# Patient Record
Sex: FEMALE | Race: WHITE | Employment: FULL TIME | URBAN - METROPOLITAN AREA
[De-identification: names, ages, dates, MRNs, and addresses within clinical notes are randomized per-mention and may not be internally consistent; named-entity substitution may affect disease eponyms.]

---

## 2018-07-03 ENCOUNTER — APPOINTMENT (OUTPATIENT)
Dept: GENERAL RADIOLOGY | Age: 48
End: 2018-07-03
Attending: EMERGENCY MEDICINE
Payer: COMMERCIAL

## 2018-07-03 ENCOUNTER — HOSPITAL ENCOUNTER (OUTPATIENT)
Age: 48
Discharge: HOME | End: 2018-07-03
Attending: EMERGENCY MEDICINE
Payer: COMMERCIAL

## 2018-07-03 VITALS
HEART RATE: 88 BPM | SYSTOLIC BLOOD PRESSURE: 107 MMHG | DIASTOLIC BLOOD PRESSURE: 62 MMHG | OXYGEN SATURATION: 97 % | WEIGHT: 156 LBS | RESPIRATION RATE: 18 BRPM | BODY MASS INDEX: 27 KG/M2 | TEMPERATURE: 98 F

## 2018-07-03 DIAGNOSIS — S76.012A HIP STRAIN, LEFT, INITIAL ENCOUNTER: Primary | ICD-10-CM

## 2018-07-03 PROCEDURE — 99204 OFFICE O/P NEW MOD 45 MIN: CPT

## 2018-07-03 PROCEDURE — 99203 OFFICE O/P NEW LOW 30 MIN: CPT

## 2018-07-03 PROCEDURE — 73502 X-RAY EXAM HIP UNI 2-3 VIEWS: CPT | Performed by: EMERGENCY MEDICINE

## 2018-07-03 RX ORDER — METHYLPREDNISOLONE 4 MG/1
TABLET ORAL
Qty: 1 PACKAGE | Refills: 0 | Status: SHIPPED | OUTPATIENT
Start: 2018-07-03 | End: 2018-07-08

## 2018-07-03 NOTE — ED PROVIDER NOTES
Patient Seen in: [facility] Immediate Care In [address] [place] [address]    History   Patient presents with:  Lower Extremity Injury (musculoskeletal)    Stated Complaint: leg pain    HPI    The patient is a 44-year-old female with past history of endometriosis, st injection  ENT: TMs are clear and flat bilaterally.   There is no posterior pharyngeal erythema  Chest: Clear to auscultation, no tenderness  Cardiovascular: Regular rate and rhythm without murmur  Abdomen: Soft, nontender and nondistended  Neurologic: Shantal

## 2018-07-20 PROCEDURE — 85613 RUSSELL VIPER VENOM DILUTED: CPT | Performed by: FAMILY MEDICINE

## 2018-07-20 PROCEDURE — 85732 THROMBOPLASTIN TIME PARTIAL: CPT | Performed by: FAMILY MEDICINE

## 2018-07-20 PROCEDURE — 85705 THROMBOPLASTIN INHIBITION: CPT | Performed by: FAMILY MEDICINE

## 2018-07-20 PROCEDURE — 85610 PROTHROMBIN TIME: CPT | Performed by: FAMILY MEDICINE

## 2020-01-09 ENCOUNTER — OFFICE VISIT (OUTPATIENT)
Dept: OTOLARYNGOLOGY | Facility: CLINIC | Age: 50
End: 2020-01-09
Payer: COMMERCIAL

## 2020-01-09 VITALS
WEIGHT: 150 LBS | DIASTOLIC BLOOD PRESSURE: 75 MMHG | SYSTOLIC BLOOD PRESSURE: 110 MMHG | HEIGHT: 64 IN | TEMPERATURE: 98 F | BODY MASS INDEX: 25.61 KG/M2

## 2020-01-09 DIAGNOSIS — J32.9 CHRONIC SINUSITIS, UNSPECIFIED LOCATION: Primary | ICD-10-CM

## 2020-01-09 PROCEDURE — 99243 OFF/OP CNSLTJ NEW/EST LOW 30: CPT | Performed by: OTOLARYNGOLOGY

## 2020-01-09 RX ORDER — IBUPROFEN 200 MG
600 TABLET ORAL DAILY
COMMUNITY

## 2020-01-09 RX ORDER — PSEUDOEPHEDRINE HYDROCHLORIDE 30 MG/1
60 TABLET ORAL DAILY
COMMUNITY

## 2020-01-09 NOTE — PROGRESS NOTES
[facility] 52[facility]  Patient presents with:  Sinus Problem: headaches, nasal congestion, facial pain and pressure for several years   Nose Problem: nosebleed from right nostril on Monday     HPI:   She has been experiencing severe h Neurological Normal Memory - Normal. Cranial nerves - Cranial nerves II through XII grossly intact.    Neck Exam Normal Inspection - Normal. Palpation - Normal. Parotid gland - Normal. Thyroid gland - Normal.   Psychiatric Normal Orientation - Oriented to

## 2020-01-15 ENCOUNTER — HOSPITAL ENCOUNTER (OUTPATIENT)
Dept: CT IMAGING | Facility: HOSPITAL | Age: 50
Discharge: HOME | End: 2020-01-15
Attending: OTOLARYNGOLOGY
Payer: COMMERCIAL

## 2020-01-15 DIAGNOSIS — J32.9 CHRONIC SINUSITIS, UNSPECIFIED LOCATION: ICD-10-CM

## 2020-01-15 PROCEDURE — 70486 CT MAXILLOFACIAL W/O DYE: CPT | Performed by: OTOLARYNGOLOGY

## 2020-01-16 ENCOUNTER — PATIENT MESSAGE (OUTPATIENT)
Dept: OTOLARYNGOLOGY | Facility: CLINIC | Age: 50
End: 2020-01-16

## 2020-01-16 NOTE — TELEPHONE ENCOUNTER
From: Nadia Jordan  To: Logan Coe. Lydia Del Rosario MD  Sent: 1/16/2020 12:40 PM CST  Subject: Test Results Question    Hello just following up on my CT Sinus scan yesterday morning.  I am sure you are waiting for the radiology report, but today was another horr

## 2020-01-16 NOTE — TELEPHONE ENCOUNTER
I think a sent a message about her CT earlier in the day. Please let her know that her CT scan shows that her sinuses are really pretty clear. I cannot explain her severe headaches based upon any issues with her sinuses.   I would recommend that she see a

## 2020-01-20 ENCOUNTER — PATIENT MESSAGE (OUTPATIENT)
Dept: OTOLARYNGOLOGY | Facility: CLINIC | Age: 50
End: 2020-01-20

## 2020-01-20 NOTE — TELEPHONE ENCOUNTER
From: Brooklyn Bae  To: Adi Nayak. Leocadia Mcburney, MD  Sent: 1/20/2020 1:07 PM CST  Subject: Test Results Question    Dr. Leocadia Mcburney- prior to my appt. tomorrow. The CT Sinus came up fairly clear-deviated septum. Would it show anything else related to tumors/brain?

## 2020-01-21 ENCOUNTER — OFFICE VISIT (OUTPATIENT)
Dept: OTOLARYNGOLOGY | Facility: CLINIC | Age: 50
End: 2020-01-21
Payer: COMMERCIAL

## 2020-01-21 VITALS
BODY MASS INDEX: 25.61 KG/M2 | TEMPERATURE: 98 F | SYSTOLIC BLOOD PRESSURE: 99 MMHG | HEIGHT: 64 IN | WEIGHT: 150 LBS | DIASTOLIC BLOOD PRESSURE: 65 MMHG

## 2020-01-21 DIAGNOSIS — J34.2 DEVIATED SEPTUM: ICD-10-CM

## 2020-01-21 DIAGNOSIS — R51.9 HEADACHE DISORDER: Primary | ICD-10-CM

## 2020-01-21 PROCEDURE — 99213 OFFICE O/P EST LOW 20 MIN: CPT | Performed by: OTOLARYNGOLOGY

## 2020-01-22 NOTE — PROGRESS NOTES
Nancy Martinez is a 52year old female. Patient presents with: Follow - Up: CT scan result done on 1/15/20    HPI:   She is in follow-up of a CT of her sinuses. I reviewed the CT scan myself and the findings with her.   CT demonstrates clear sinus pa Inspection - Normal. Palpation - Normal. Parotid gland - Normal. Thyroid gland - Normal.   Psychiatric Normal Orientation - Oriented to time, place, person & situation. Appropriate mood and affect. Lymph Detail Normal Submental. Submandibular.  Anterior c

## 2020-02-01 ENCOUNTER — OFFICE VISIT (OUTPATIENT)
Dept: NEUROLOGY | Facility: CLINIC | Age: 50
End: 2020-02-01
Payer: COMMERCIAL

## 2020-02-01 VITALS
BODY MASS INDEX: 25.61 KG/M2 | DIASTOLIC BLOOD PRESSURE: 60 MMHG | RESPIRATION RATE: 16 BRPM | HEIGHT: 64 IN | HEART RATE: 88 BPM | WEIGHT: 150 LBS | SYSTOLIC BLOOD PRESSURE: 98 MMHG

## 2020-02-01 DIAGNOSIS — G89.29 CHRONIC LEFT-SIDED LOW BACK PAIN WITH LEFT-SIDED SCIATICA: ICD-10-CM

## 2020-02-01 DIAGNOSIS — G43.019 INTRACTABLE MIGRAINE WITHOUT AURA AND WITHOUT STATUS MIGRAINOSUS: Primary | ICD-10-CM

## 2020-02-01 DIAGNOSIS — M54.42 CHRONIC LEFT-SIDED LOW BACK PAIN WITH LEFT-SIDED SCIATICA: ICD-10-CM

## 2020-02-01 DIAGNOSIS — R51.9 CHRONIC DAILY HEADACHE: ICD-10-CM

## 2020-02-01 PROCEDURE — 99244 OFF/OP CNSLTJ NEW/EST MOD 40: CPT | Performed by: OTHER

## 2020-02-01 RX ORDER — METHYLPREDNISOLONE 4 MG/1
TABLET ORAL
Qty: 1 PACKAGE | Refills: 0 | Status: SHIPPED | OUTPATIENT
Start: 2020-02-01 | End: 2020-08-17

## 2020-02-01 RX ORDER — ONDANSETRON 4 MG/1
TABLET, FILM COATED ORAL
Qty: 30 TABLET | Refills: 3 | Status: SHIPPED | OUTPATIENT
Start: 2020-02-01

## 2020-02-01 RX ORDER — TOPIRAMATE 25 MG/1
TABLET ORAL
Qty: 60 TABLET | Refills: 3 | Status: SHIPPED | OUTPATIENT
Start: 2020-02-01 | End: 2020-04-21

## 2020-02-03 ENCOUNTER — TELEPHONE (OUTPATIENT)
Dept: NEUROLOGY | Facility: CLINIC | Age: 50
End: 2020-02-03

## 2020-02-03 NOTE — TELEPHONE ENCOUNTER
[employer] for authorization of approval forTrigger point injections 2-3 muscle groups, occipital nerve block to left sidecpt codes , 96295, 31887. Aurthorization is not required. Transaction ID: [id number]   Will inform Oseas.

## 2020-02-09 ENCOUNTER — PATIENT MESSAGE (OUTPATIENT)
Dept: NEUROLOGY | Facility: CLINIC | Age: 50
End: 2020-02-09

## 2020-02-10 NOTE — TELEPHONE ENCOUNTER
From: Afshan Madden  To:  Alfie Singh MD  Sent: 2/9/2020 9:32 PM CST  Subject: Visit Follow-up Question    Hi Dr. Massiel Pabon, wondering about my neck and before any injections are done wouldn't it be beneficial to have a neck exam and possibly an MRI o

## 2020-02-11 NOTE — PROGRESS NOTES
Neurology Outpatient Consult Note    Jennifer Collier : 1970   Referring Physician: Dr. Ana Tolentino  HPI:     Jennifer Collier is a 52year old female who is being seen in neurologic evaluation. Patient being seen for several complaints. 60 mg by mouth daily. • ibuprofen 200 MG Oral Tab Take 600 mg by mouth daily.           Past Medical History:   Diagnosis Date   • Carpal tunnel syndrome     right hand   • Endometriosis    • Inflammatory arthritis       Past Surgical History:   Procedu and pinprick throughout  Reflexes: DTRs 2+ in bilateral biceps, brachioradialis, patella  Coordination / gait: no finger-nose-finger dysmetria, normal gait    Studies / labs: Reviewed  ESR, CRP normal  AMMY negative, rheumatoid factor negative, TSH normal

## 2020-03-03 ENCOUNTER — PATIENT MESSAGE (OUTPATIENT)
Dept: NEUROLOGY | Facility: CLINIC | Age: 50
End: 2020-03-03

## 2020-03-03 NOTE — TELEPHONE ENCOUNTER
From: Milo Culp  To: Elvira Mittal MD  Sent: 3/3/2020 2:10 PM CST  Subject: Visit Follow-up Question    Hi Dr. Grant Self- checking in about the scan on my neck.  I am prett.y confident that the muscles and tendons are an issue in my meck along with

## 2020-04-10 ENCOUNTER — TELEPHONE (OUTPATIENT)
Dept: NEUROLOGY | Facility: CLINIC | Age: 50
End: 2020-04-10

## 2020-04-14 ENCOUNTER — PATIENT MESSAGE (OUTPATIENT)
Dept: NEUROLOGY | Facility: CLINIC | Age: 50
End: 2020-04-14

## 2020-04-21 ENCOUNTER — VIRTUAL PHONE E/M (OUTPATIENT)
Dept: NEUROLOGY | Facility: CLINIC | Age: 50
End: 2020-04-21
Payer: COMMERCIAL

## 2020-04-21 DIAGNOSIS — M54.2 NECK PAIN: ICD-10-CM

## 2020-04-21 DIAGNOSIS — G43.019 INTRACTABLE MIGRAINE WITHOUT AURA AND WITHOUT STATUS MIGRAINOSUS: Primary | ICD-10-CM

## 2020-04-21 DIAGNOSIS — R51.9 CHRONIC DAILY HEADACHE: ICD-10-CM

## 2020-04-21 PROCEDURE — 99212 OFFICE O/P EST SF 10 MIN: CPT | Performed by: OTHER

## 2020-04-21 RX ORDER — CYCLOBENZAPRINE HCL 10 MG
TABLET ORAL
Qty: 30 TABLET | Refills: 3 | Status: SHIPPED | OUTPATIENT
Start: 2020-04-21 | End: 2021-07-28

## 2020-04-21 RX ORDER — TOPIRAMATE 25 MG/1
75 TABLET ORAL NIGHTLY
Qty: 90 TABLET | Refills: 3 | Status: SHIPPED | OUTPATIENT
Start: 2020-04-21 | End: 2020-08-17

## 2020-04-21 RX ORDER — RIZATRIPTAN BENZOATE 10 MG/1
TABLET ORAL
Qty: 12 TABLET | Refills: 3 | Status: SHIPPED | OUTPATIENT
Start: 2020-04-21 | End: 2021-05-24

## 2020-04-23 NOTE — PROGRESS NOTES
Neurology Telephone / Virtual Follow-up Note    Scott Giang is a 48year old female. HPI:     I saw patient in the office last in February of this year. She is frustrated, because headaches do not seem to be improved.   She is not certain the No dysarthria or dysphonia    ASSESSMENT/PLAN:   Intractable migraine without aura and without status migrainosus  Chronic daily headache  Neck pain    Likely strong cervicogenic component to migraine headaches.       –Had a long discussion regarding variou

## 2020-06-09 ENCOUNTER — PATIENT MESSAGE (OUTPATIENT)
Dept: NEUROLOGY | Facility: CLINIC | Age: 50
End: 2020-06-09

## 2020-06-09 DIAGNOSIS — G43.019 INTRACTABLE MIGRAINE WITHOUT AURA AND WITHOUT STATUS MIGRAINOSUS: Primary | ICD-10-CM

## 2020-06-09 DIAGNOSIS — M54.2 NECK PAIN: ICD-10-CM

## 2020-06-10 ENCOUNTER — TELEPHONE (OUTPATIENT)
Dept: NEUROLOGY | Facility: CLINIC | Age: 50
End: 2020-06-10

## 2020-06-10 NOTE — TELEPHONE ENCOUNTER
[employer] for authorization of approval for trigger point injections cpt codes , 23052. Authorization is not required. Transaction ID: [id number]. Per Dr. Rosangela Layne, add on 06/12/20 at 12 noon. Will need order/referral. Will inform Nursing.  [facility]

## 2020-06-10 NOTE — TELEPHONE ENCOUNTER
From: Rosezetta Ahumada  To: Maureen Kraus MD  Sent: 6/9/2020 7:25 PM CDT  Subject: Prescription Question    HI Dr. Luna Blackman weaned off Topiramate.  When the strength increased I was feeling more forgetful, tired, having issues w/ my legs/feet tingling/cra

## 2020-06-16 NOTE — TELEPHONE ENCOUNTER
If we can please call pt re: trigger point injections (she is inquiring about specifics of cost before scheduling), thank you.

## 2020-07-23 ENCOUNTER — TELEPHONE (OUTPATIENT)
Dept: FAMILY MEDICINE CLINIC | Facility: CLINIC | Age: 50
End: 2020-07-23

## 2020-07-23 ENCOUNTER — PATIENT MESSAGE (OUTPATIENT)
Dept: FAMILY MEDICINE CLINIC | Facility: CLINIC | Age: 50
End: 2020-07-23

## 2020-07-23 DIAGNOSIS — Z00.00 ANNUAL PHYSICAL EXAM: Primary | ICD-10-CM

## 2020-07-23 NOTE — TELEPHONE ENCOUNTER
Ish message sent. See telephone acute encounter 7/23/20      From: Sumi Webber  To: Snehal Ríos MD  Sent: 7/23/2020  6:53 PM CDT  Subject: Non-Urgent Medical Question    Hi Dr. Rome Boyle.  For some reason my messages have not been going thru an

## 2020-07-23 NOTE — TELEPHONE ENCOUNTER
Ish message sent. RN=call and triage, Rashad Kumar been seen in our clinic. No future appointments.         ----- Message from Nik Herr sent at 7/23/2020  6:53 PM CDT -----  Regarding: Non-Urgent Medical Question  Contact: [phone]

## 2020-07-24 ENCOUNTER — TELEPHONE (OUTPATIENT)
Dept: NEUROLOGY | Facility: CLINIC | Age: 50
End: 2020-07-24

## 2020-07-24 NOTE — TELEPHONE ENCOUNTER
See acute telephone encounter 7/23/20. From: Quirino Montesinos  To: Iris Hernandez MD  Sent: 7/23/2020  7:16 PM CDT  Subject: Other    This is for Friday AM 7/24/20 when office opens Glory Hunter. I am due for my annual physical and fasting la

## 2020-07-24 NOTE — TELEPHONE ENCOUNTER
L/m advising Dr. Madhu Rock is leaving the clinic/practice. Informed Dr. Simón Richard or Dr. Toma Connolly can provide Botox injections. Will need to know who she would likE to see in order to initiate authorization request. Await return call.

## 2020-07-27 NOTE — TELEPHONE ENCOUNTER
Dr. Wander Melendez, patient requesting to be seen for issues below and her annual. She is on vacation week of August 17. Would it be okay to use one of your \"res 24\" slots.

## 2020-07-27 NOTE — TELEPHONE ENCOUNTER
Left message to call back =second attempt. [employer] message read by patient on 7/23/20. TRIAGE SUPPORT=please send NO PHONE RESPONSE LETTER. No future appointments.       RE: Non-Urgent Medical Question   Message [phone]   From  Bobby Crawford

## 2020-07-27 NOTE — TELEPHONE ENCOUNTER
Patient returned call. Phone call transferred to [facility] to assist in scheduling annual physical for patient. Patient schedule for physical on 08/17/20    Dr. Irineo Najjar, patient asked if you can order blood work prior to her annual physical. Please advise.

## 2020-07-29 NOTE — TELEPHONE ENCOUNTER
Pt calling to ask what specific labs were ordered and if fasting is needed. Pt informed of lab orders and that fasting is needed; provided with central scheduling phone # to schedule appt as states was not informed appt was needed in voice mail.

## 2020-07-29 NOTE — TELEPHONE ENCOUNTER
DR Robertson=see below, pended annual blood tests. Add if needed.     Future Appointments   Date Time Provider Chrissy Ricci   8/17/2020 11:30 AM Hazel Bravo MD [facility]

## 2020-08-01 ENCOUNTER — LAB ENCOUNTER (OUTPATIENT)
Dept: LAB | Age: 50
End: 2020-08-01
Attending: FAMILY MEDICINE
Payer: COMMERCIAL

## 2020-08-01 DIAGNOSIS — Z00.00 ANNUAL PHYSICAL EXAM: ICD-10-CM

## 2020-08-01 LAB
ALBUMIN SERPL-MCNC: 4.1 G/DL (ref 3.4–5)
ALBUMIN/GLOB SERPL: 1.2 {RATIO} (ref 1–2)
ALP LIVER SERPL-CCNC: 70 U/L (ref 39–100)
ALT SERPL-CCNC: 17 U/L (ref 13–56)
ANION GAP SERPL CALC-SCNC: 3 MMOL/L (ref 0–18)
AST SERPL-CCNC: 13 U/L (ref 15–37)
BASOPHILS # BLD AUTO: 0.07 X10(3) UL (ref 0–0.2)
BASOPHILS NFR BLD AUTO: 1.1 %
BILIRUB SERPL-MCNC: 0.6 MG/DL (ref 0.1–2)
BUN BLD-MCNC: 9 MG/DL (ref 7–18)
BUN/CREAT SERPL: 14.1 (ref 10–20)
CALCIUM BLD-MCNC: 8.5 MG/DL (ref 8.5–10.1)
CHLORIDE SERPL-SCNC: 108 MMOL/L (ref 98–112)
CHOLEST SMN-MCNC: 154 MG/DL (ref ?–200)
CO2 SERPL-SCNC: 29 MMOL/L (ref 21–32)
CREAT BLD-MCNC: 0.64 MG/DL (ref 0.55–1.02)
DEPRECATED RDW RBC AUTO: 41.9 FL (ref 35.1–46.3)
EOSINOPHIL # BLD AUTO: 0.08 X10(3) UL (ref 0–0.7)
EOSINOPHIL NFR BLD AUTO: 1.3 %
ERYTHROCYTE [DISTWIDTH] IN BLOOD BY AUTOMATED COUNT: 12.7 % (ref 11–15)
GLOBULIN PLAS-MCNC: 3.3 G/DL (ref 2.8–4.4)
GLUCOSE BLD-MCNC: 84 MG/DL (ref 70–99)
HCT VFR BLD AUTO: 39.6 % (ref 35–48)
HDLC SERPL-MCNC: 56 MG/DL (ref 40–59)
HGB BLD-MCNC: 13.1 G/DL (ref 12–16)
IMM GRANULOCYTES # BLD AUTO: 0.02 X10(3) UL (ref 0–1)
IMM GRANULOCYTES NFR BLD: 0.3 %
LDLC SERPL CALC-MCNC: 87 MG/DL (ref ?–100)
LYMPHOCYTES # BLD AUTO: 1.14 X10(3) UL (ref 1–4)
LYMPHOCYTES NFR BLD AUTO: 18.6 %
M PROTEIN MFR SERPL ELPH: 7.4 G/DL (ref 6.4–8.2)
MCH RBC QN AUTO: 29.6 PG (ref 26–34)
MCHC RBC AUTO-ENTMCNC: 33.1 G/DL (ref 31–37)
MCV RBC AUTO: 89.4 FL (ref 80–100)
MONOCYTES # BLD AUTO: 0.45 X10(3) UL (ref 0.1–1)
MONOCYTES NFR BLD AUTO: 7.4 %
NEUTROPHILS # BLD AUTO: 4.36 X10 (3) UL (ref 1.5–7.7)
NEUTROPHILS # BLD AUTO: 4.36 X10(3) UL (ref 1.5–7.7)
NEUTROPHILS NFR BLD AUTO: 71.3 %
NONHDLC SERPL-MCNC: 98 MG/DL (ref ?–130)
OSMOLALITY SERPL CALC.SUM OF ELEC: 288 MOSM/KG (ref 275–295)
PATIENT FASTING Y/N/NP: YES
PATIENT FASTING Y/N/NP: YES
PLATELET # BLD AUTO: 259 10(3)UL (ref 150–450)
POTASSIUM SERPL-SCNC: 3.7 MMOL/L (ref 3.5–5.1)
RBC # BLD AUTO: 4.43 X10(6)UL (ref 3.8–5.3)
SODIUM SERPL-SCNC: 140 MMOL/L (ref 136–145)
TRIGL SERPL-MCNC: 57 MG/DL (ref 30–149)
TSI SER-ACNC: 1.38 MIU/ML (ref 0.36–3.74)
VIT B12 SERPL-MCNC: 520 PG/ML (ref 193–986)
VLDLC SERPL CALC-MCNC: 11 MG/DL (ref 0–30)
WBC # BLD AUTO: 6.1 X10(3) UL (ref 4–11)

## 2020-08-01 PROCEDURE — 82607 VITAMIN B-12: CPT

## 2020-08-01 PROCEDURE — 36415 COLL VENOUS BLD VENIPUNCTURE: CPT

## 2020-08-01 PROCEDURE — 80053 COMPREHEN METABOLIC PANEL: CPT

## 2020-08-01 PROCEDURE — 84443 ASSAY THYROID STIM HORMONE: CPT

## 2020-08-01 PROCEDURE — 82306 VITAMIN D 25 HYDROXY: CPT

## 2020-08-01 PROCEDURE — 85025 COMPLETE CBC W/AUTO DIFF WBC: CPT

## 2020-08-01 PROCEDURE — 80061 LIPID PANEL: CPT

## 2020-08-03 LAB — 25(OH)D3 SERPL-MCNC: 21 NG/ML (ref 30–100)

## 2020-08-08 NOTE — PROGRESS NOTES
Parul Come - Will review at your upcoming appointment but your labs were normal except low vitamin D levels.  You should take vitamin D 2000 units daily to boost those levels. - Dr. Wander Melendez

## 2020-08-17 ENCOUNTER — OFFICE VISIT (OUTPATIENT)
Dept: FAMILY MEDICINE CLINIC | Facility: CLINIC | Age: 50
End: 2020-08-17
Payer: COMMERCIAL

## 2020-08-17 ENCOUNTER — HOSPITAL ENCOUNTER (OUTPATIENT)
Dept: GENERAL RADIOLOGY | Age: 50
Discharge: HOME | End: 2020-08-17
Attending: FAMILY MEDICINE
Payer: COMMERCIAL

## 2020-08-17 VITALS
DIASTOLIC BLOOD PRESSURE: 68 MMHG | BODY MASS INDEX: 25.61 KG/M2 | WEIGHT: 150 LBS | HEART RATE: 83 BPM | HEIGHT: 64 IN | SYSTOLIC BLOOD PRESSURE: 106 MMHG

## 2020-08-17 DIAGNOSIS — G43.019 INTRACTABLE MIGRAINE WITHOUT AURA AND WITHOUT STATUS MIGRAINOSUS: ICD-10-CM

## 2020-08-17 DIAGNOSIS — M54.2 NECK PAIN: ICD-10-CM

## 2020-08-17 DIAGNOSIS — E55.9 VITAMIN D DEFICIENCY: ICD-10-CM

## 2020-08-17 DIAGNOSIS — Z00.00 ANNUAL PHYSICAL EXAM: Primary | ICD-10-CM

## 2020-08-17 DIAGNOSIS — Z12.11 COLON CANCER SCREENING: ICD-10-CM

## 2020-08-17 PROCEDURE — 3078F DIAST BP <80 MM HG: CPT | Performed by: FAMILY MEDICINE

## 2020-08-17 PROCEDURE — 99396 PREV VISIT EST AGE 40-64: CPT | Performed by: FAMILY MEDICINE

## 2020-08-17 PROCEDURE — 72050 X-RAY EXAM NECK SPINE 4/5VWS: CPT | Performed by: FAMILY MEDICINE

## 2020-08-17 PROCEDURE — 3074F SYST BP LT 130 MM HG: CPT | Performed by: FAMILY MEDICINE

## 2020-08-17 PROCEDURE — 3008F BODY MASS INDEX DOCD: CPT | Performed by: FAMILY MEDICINE

## 2020-08-17 NOTE — PROGRESS NOTES
HPI:   Dominic Vail is a 48year old female who presents for a complete physical exam.    Has off this week so happy. Taking ibuprofen 3-6 a day for migraines.  Seeing Dr. Ba Calabrese for migraines but she left - recommend physiatrist. Has a lot of ten Family History   Problem Relation Age of Onset   • Cancer Maternal Grandmother 39        breast cancer   • Breast Cancer Maternal Grandmother 39      Social History:   Social History    Tobacco Use      Smoking status: Never Smoker      Smokeless tobacco Colon cancer screening    - GASTRO - INTERNAL    4. Neck pain    - XR CERVICAL SPINE (4VIEWS) (CPT=72050); Future    5.  Vitamin D deficiency      Sai Sue MD  8/17/2020  11:45 AM

## 2020-08-23 ENCOUNTER — PATIENT MESSAGE (OUTPATIENT)
Dept: FAMILY MEDICINE CLINIC | Facility: CLINIC | Age: 50
End: 2020-08-23

## 2020-08-24 NOTE — PROGRESS NOTES
Umang Castorena - You have curvature straightening which is due to muscle spasms. You do have lower cervical spine arthritis.  I recommend you see Dr. Trell Jacome as discussed for treatment options. - Dr. Lacho Loya

## 2020-09-22 ENCOUNTER — NURSE ONLY (OUTPATIENT)
Dept: GASTROENTEROLOGY | Facility: CLINIC | Age: 50
End: 2020-09-22

## 2020-09-22 NOTE — PROGRESS NOTES
PHONE ROOM--I 883 Leyla Arora TO PATIENT THIS MORNING, AS SHE HAS NOT COMPLETED THE 2 QUESTIONNAIRES. IF SHE CALLS BACK SHE NEEDS ANOTHER PHONE SCREEN APPT OR CAN MAKE AN APPT WITH Laura Winkler [address]. SHE HAS NEVER BEEN SEEN BY OUR OFFICE. THANKS.

## 2021-02-08 RX ORDER — RIZATRIPTAN BENZOATE 10 MG/1
TABLET ORAL
Qty: 12 TABLET | Refills: 0 | OUTPATIENT
Start: 2021-02-08

## 2021-02-08 NOTE — TELEPHONE ENCOUNTER
Denied- Refill request for RIZATRIPTAN BENZOATE 10 MG Oral Tab, use at onset; may repeat once after 2 hours- ONLY 2 IN 24 HOUR PERIOD MAX. , 12 Tablets with 0 Refills    LOV: 4/21/20- Dr. Fernandez Slice: None    Last Refilled: 4/21/20    Patient no longer under prescriber's care.

## 2021-05-24 ENCOUNTER — TELEPHONE (OUTPATIENT)
Dept: FAMILY MEDICINE CLINIC | Facility: CLINIC | Age: 51
End: 2021-05-24

## 2021-05-24 DIAGNOSIS — G43.019 INTRACTABLE MIGRAINE WITHOUT AURA AND WITHOUT STATUS MIGRAINOSUS: Primary | ICD-10-CM

## 2021-05-24 NOTE — TELEPHONE ENCOUNTER
Refill request for Rizatriptan Benzoate 10 MG Oral Tab, use at onset; may repeat once after 2 hours- ONLY 2 IN 24 HOUR PERIOD MAX.  This is a 30 day supply, 12 Tablets with 0 Refills    LOV: 4/21/20-   NOV: 6/29/21-     Last Refilled: 4/21

## 2021-05-24 NOTE — TELEPHONE ENCOUNTER
Spoke with patient ( verified)--reports she completed mammogram Spring 2021 at [facility] ([phone])--asking if Dr. Darryn Rueda received results--unable to locate scanned mammogram results under media tab.      Sent to [facility] office to [facility]

## 2021-05-24 NOTE — TELEPHONE ENCOUNTER
Left detailed message on medical records dept confidential voicemail requesting copy of mammogram report be faxed to [phone]

## 2021-05-25 RX ORDER — RIZATRIPTAN BENZOATE 10 MG/1
TABLET ORAL
Qty: 12 TABLET | Refills: 0 | Status: SHIPPED | OUTPATIENT
Start: 2021-05-25 | End: 2021-08-03

## 2021-05-26 NOTE — TELEPHONE ENCOUNTER
Second attempt: Left detailed message on medical records dept confidential voicemail requesting copy of mammogram report be faxed to [phone]

## 2021-06-03 NOTE — TELEPHONE ENCOUNTER
Third attempt: Left detailed message on medical records dept confidential voicemail requesting copy of mammogram report be faxed to [phone]

## 2021-06-03 NOTE — TELEPHONE ENCOUNTER
Sent mammogram request to [facility] fax# [phone] Attn: Pollo Schuster. Confirmation rec'd.

## 2021-06-29 ENCOUNTER — OFFICE VISIT (OUTPATIENT)
Dept: NEUROLOGY | Facility: CLINIC | Age: 51
End: 2021-06-29
Payer: COMMERCIAL

## 2021-06-29 ENCOUNTER — TELEPHONE (OUTPATIENT)
Dept: NEUROLOGY | Facility: CLINIC | Age: 51
End: 2021-06-29

## 2021-06-29 VITALS
HEART RATE: 90 BPM | BODY MASS INDEX: 25.2 KG/M2 | HEIGHT: 64 IN | WEIGHT: 147.63 LBS | SYSTOLIC BLOOD PRESSURE: 98 MMHG | DIASTOLIC BLOOD PRESSURE: 62 MMHG

## 2021-06-29 DIAGNOSIS — M79.605 PAIN IN BOTH LOWER EXTREMITIES: ICD-10-CM

## 2021-06-29 DIAGNOSIS — IMO0002 CHRONIC MIGRAINE: Primary | ICD-10-CM

## 2021-06-29 DIAGNOSIS — M79.604 PAIN IN BOTH LOWER EXTREMITIES: ICD-10-CM

## 2021-06-29 DIAGNOSIS — M54.2 CERVICALGIA: ICD-10-CM

## 2021-06-29 PROCEDURE — 3078F DIAST BP <80 MM HG: CPT | Performed by: OTHER

## 2021-06-29 PROCEDURE — 3008F BODY MASS INDEX DOCD: CPT | Performed by: OTHER

## 2021-06-29 PROCEDURE — 99215 OFFICE O/P EST HI 40 MIN: CPT | Performed by: OTHER

## 2021-06-29 PROCEDURE — 3074F SYST BP LT 130 MM HG: CPT | Performed by: OTHER

## 2021-06-29 NOTE — PATIENT INSTRUCTIONS
-Preventive: reduce how many migraines you get. Options: Botox every 3 months; or the once monthly injections of Emgality; Ajovy; Aimovig   -Follow up in 1 month or sooner if needed.     -If you develop sudden onset loss of vision, double vision, room spinn swelling)  A few people may have an allergic reactions to this medicine. Symptoms can include difficulty breathing, skin rash, itching, swelling, or severe dizziness. If you notice any of these symptoms, seek medical help quickly.         Botox Injection 20 weakness, loss of bladder control, or changes in vision. Some patients taking this medicine have experienced serious side effects. Please speak with your doctor to understand the risks and benefits associated with this medicine.   Your ability to stay aler (pain, redness, swelling)  · irritation of the eyes  · nausea  · neck pain or stiffness  · pain near injection site  · skin tingling, burning or prickly feeling  · upper respiratory infection  · blurring or changes of vision  · watering of the eyes  Call y good muscle relaxant. It is also useful for constipation which can be a side effect of other medications used to treat migraine. Good sources include nuts, whole grains, and tomatoes. Riboflavin (vitamin B 2) 400 mg in the morning.  This vitamin assists effective. Melatonin: Increasing evidence shows correlation between melatonin secretion and headache conditions. Melatonin supplementation has decreased headache intensity and duration. It is widely used as a sleep aid.   Sleep is natures way of dealin permitted)    Tomato based foods, pizza (lasagna, etc.)    MSG (monosodium glutamate) is disguised as many things; look for these common aliases:  Monopotassium glutamate  Autolysed yeast  Hydrolysed protein  Sodium caseinate  “flavorings”  “all natural pr quiet environment. Relax and reduce stress. Pbvutmn8Rloaq is a free derrick that can instruct you on    some simple relaxtion and breathing techniques. Http://[employer]. [employer] is a    free website that provides teaching videos on relaxation.   Also, there are  man based headaches. There is nothing available that completely prevents headaches from occurring, breaking through, or having periodic flare-ups and fluctuations.   Regardless of what you are using on a daily basis for prevention, episodic headaches should st time first.  If you are unable to wait it out for medications to work, we can also try IV infusions for some temporary relief.        In general, the best that preventive medications or other treatments (including Botox) are able to offer in migraine manage concerns. These issues beyond simple questions require a follow up visit with myself,      Refills:  Please pay attention to when your refills will need to be renewed.  Due to the volume of phone calls daily, this could potentially take a few days, althoug

## 2021-06-29 NOTE — TELEPHONE ENCOUNTER
[employer] for authorization of approval for ACUPUNCTURE THERAPY cpt codes W8152434, [id number]. Authorization is not required. Transaction ID: [id number]. Pt.  informed of above. She will call to schedule appt. Zuhair Rubio

## 2021-06-29 NOTE — PROGRESS NOTES
Tasneem Dub [address]  [address]  [phone]          Established  Follow Up Visit       Date: June 29, 2021  Patient Name: Sumi Webber   MRN: [id number]  Primary physician: [address] [place] [address],Fourth Floor injectables. She does not like taking pills daily. --She has depression. --From her hips to her feet she has excruciating throbbing pain, which is helped with Ibuprofen. She was recommended to see PM&R.      --She is working almost 80 hours per week symmetrically, tongue protrudes midline, and shoulder shrug intact and symmetric. Motor: muscle strength 5/5 both upper and lower extremities, no drift, normal tone.   Reflexes: UE and LE reflexes are equal and reactive  Sensation: intact to light touch  C 39 - 100 U/L 70   Total Bilirubin      0.1 - 2.0 mg/dL 0.6   TOTAL PROTEIN      6.4 - 8.2 g/dL 7.4   Albumin      3.4 - 5.0 g/dL 4.1   Globulin      2.8 - 4.4 g/dL 3.3   A/G Ratio      1.0 - 2.0 1.2   Patient Fasting?        Yes   TSH      0.358 - 3.740 mI avoid developing medication overuse headaches   -Neuroimaging: Hold for now in light of normal exam and similar characteristics of migraine  -Follow up: 1 month so we can determine which preventive option as needed  -Lifestyle information provided      Cer

## 2021-07-27 ENCOUNTER — OFFICE VISIT (OUTPATIENT)
Dept: NEUROLOGY | Facility: CLINIC | Age: 51
End: 2021-07-27
Payer: COMMERCIAL

## 2021-07-27 ENCOUNTER — TELEPHONE (OUTPATIENT)
Dept: NEUROLOGY | Facility: CLINIC | Age: 51
End: 2021-07-27

## 2021-07-27 ENCOUNTER — HOSPITAL ENCOUNTER (OUTPATIENT)
Dept: GENERAL RADIOLOGY | Age: 51
Discharge: HOME | End: 2021-07-27
Attending: PHYSICAL MEDICINE & REHABILITATION
Payer: COMMERCIAL

## 2021-07-27 VITALS
HEIGHT: 64 IN | BODY MASS INDEX: 24.75 KG/M2 | OXYGEN SATURATION: 97 % | HEART RATE: 80 BPM | DIASTOLIC BLOOD PRESSURE: 62 MMHG | SYSTOLIC BLOOD PRESSURE: 112 MMHG | WEIGHT: 145 LBS

## 2021-07-27 DIAGNOSIS — G89.29 CHRONIC LEFT SHOULDER PAIN: ICD-10-CM

## 2021-07-27 DIAGNOSIS — IMO0002 CHRONIC MIGRAINE: ICD-10-CM

## 2021-07-27 DIAGNOSIS — G43.019 INTRACTABLE MIGRAINE WITHOUT AURA AND WITHOUT STATUS MIGRAINOSUS: ICD-10-CM

## 2021-07-27 DIAGNOSIS — M54.2 TRIGGER POINT OF NECK: ICD-10-CM

## 2021-07-27 DIAGNOSIS — M25.512 CHRONIC LEFT SHOULDER PAIN: ICD-10-CM

## 2021-07-27 DIAGNOSIS — M75.42 IMPINGEMENT SYNDROME OF LEFT SHOULDER: ICD-10-CM

## 2021-07-27 DIAGNOSIS — M54.2 NECK PAIN: ICD-10-CM

## 2021-07-27 DIAGNOSIS — M47.812 CERVICAL FACET SYNDROME: ICD-10-CM

## 2021-07-27 DIAGNOSIS — M75.52 SUBACROMIAL BURSITIS OF LEFT SHOULDER JOINT: ICD-10-CM

## 2021-07-27 DIAGNOSIS — M62.838 CERVICAL PARASPINAL MUSCLE SPASM: ICD-10-CM

## 2021-07-27 DIAGNOSIS — R51.9 CHRONIC DAILY HEADACHE: ICD-10-CM

## 2021-07-27 DIAGNOSIS — M54.2 CERVICALGIA: ICD-10-CM

## 2021-07-27 DIAGNOSIS — IMO0002 CHRONIC MIGRAINE: Primary | ICD-10-CM

## 2021-07-27 PROCEDURE — 3074F SYST BP LT 130 MM HG: CPT | Performed by: PHYSICAL MEDICINE & REHABILITATION

## 2021-07-27 PROCEDURE — 3008F BODY MASS INDEX DOCD: CPT | Performed by: PHYSICAL MEDICINE & REHABILITATION

## 2021-07-27 PROCEDURE — 3078F DIAST BP <80 MM HG: CPT | Performed by: PHYSICAL MEDICINE & REHABILITATION

## 2021-07-27 PROCEDURE — 99244 OFF/OP CNSLTJ NEW/EST MOD 40: CPT | Performed by: PHYSICAL MEDICINE & REHABILITATION

## 2021-07-27 PROCEDURE — 73030 X-RAY EXAM OF SHOULDER: CPT | Performed by: PHYSICAL MEDICINE & REHABILITATION

## 2021-07-27 RX ORDER — NAPROXEN 500 MG/1
500 TABLET ORAL 2 TIMES DAILY WITH MEALS
Qty: 60 TABLET | Refills: 0 | Status: SHIPPED | OUTPATIENT
Start: 2021-07-27

## 2021-07-27 NOTE — TELEPHONE ENCOUNTER
Informed patient of insurance approval. Patient would like to call her insurance to find out how much it will cost her. Sent CPT code through [address]

## 2021-07-27 NOTE — H&P
[address] H&P    Requesting Physician: Griselda Livingston MD    Chief Complaint (Reason for Visit):  Patient presents with:  Neck Pain: New right handed patient comes in for bilateral neck pain that r administered 2021   • Endometriosis    • Inflammatory arthritis    • Migraine with aura        PAST SURGICAL HISTORY:   Past Surgical History:   Procedure Laterality Date   • HYSTERECTOMY      ovaries still present   • LAPAROSCOPIC     •      • Cardiovascular: Negative. Gastrointestinal: Negative. Genitourinary: Negative. Musculoskeletal: As per HPI   Skin: Negative. Neurological: As per HPI  Endo/Heme/Allergies: Negative. Psychiatric/Behavioral: Negative.       All other systems of motion of the left shoulder with pain during external rotation and internal rotation  Neer's test: Positive  Hawkin's test: Positive  Cross Arm Test: negative for [facility] joint pain  Speed's Test: Positive  Yergason Test: negative for biceps tendon pain  Emp 08/01/2020 98  <130 mg/dL Final   • FASTING 08/01/2020 Yes   Final   • TSH 08/01/2020 1.380  0.358 - 3.740 mIU/mL Final   • Vitamin B12 08/01/2020 520  193 - 986 pg/mL Final   • Vitamin D, 25OH, Total 08/01/2020 21.0* 30.0 - 100.0 ng/mL Final   • WBC 08/01 atlantoaxial joints. VERTEBRAL BODIES: Minimal vertebral body spurring and facet arthritis is noted. Elongated transverse process of C7 noted on right. Otherwise no significant subluxation, fracture or visible bony lesion.    DISC SPACES: There is narr learning.          Chronic migraine  (primary encounter diagnosis)  Neck pain  Cervicalgia  Chronic daily headache  Intractable migraine without aura and without status migrainosus  Chronic left shoulder pain  Cervical facet syndrome  Trigger point of neck

## 2021-07-27 NOTE — PATIENT INSTRUCTIONS
1) Get Xr of the left shoulder today on your way out  2) Take Naprosyn 500 mg 1 tablet twice per day with food for the next two weeks and then as needed but no more than 2 tablets per day. Do not take with any other NSAIDS (Ibuprofen, Advil, Aleve, etc).  O

## 2021-07-27 NOTE — TELEPHONE ENCOUNTER
[employer] for authorization of approval for Trigger point injections cpt code 53615. Authorization is not required. Transaction ID: [id number]. Will inform Nursing.

## 2021-07-28 ENCOUNTER — OFFICE VISIT (OUTPATIENT)
Dept: NEUROLOGY | Facility: CLINIC | Age: 51
End: 2021-07-28
Payer: COMMERCIAL

## 2021-07-28 VITALS
WEIGHT: 145 LBS | DIASTOLIC BLOOD PRESSURE: 64 MMHG | BODY MASS INDEX: 24.75 KG/M2 | HEIGHT: 64 IN | HEART RATE: 80 BPM | SYSTOLIC BLOOD PRESSURE: 108 MMHG

## 2021-07-28 DIAGNOSIS — G44.86 CERVICOGENIC HEADACHE: ICD-10-CM

## 2021-07-28 DIAGNOSIS — IMO0002 CHRONIC MIGRAINE: Primary | ICD-10-CM

## 2021-07-28 PROCEDURE — 99214 OFFICE O/P EST MOD 30 MIN: CPT | Performed by: OTHER

## 2021-07-28 PROCEDURE — 3078F DIAST BP <80 MM HG: CPT | Performed by: OTHER

## 2021-07-28 PROCEDURE — 3008F BODY MASS INDEX DOCD: CPT | Performed by: OTHER

## 2021-07-28 PROCEDURE — 3074F SYST BP LT 130 MM HG: CPT | Performed by: OTHER

## 2021-07-28 RX ORDER — GALCANEZUMAB 120 MG/ML
120 INJECTION, SOLUTION SUBCUTANEOUS AS DIRECTED
Qty: 2 EACH | Refills: 0 | Status: SHIPPED | OUTPATIENT
Start: 2021-07-28

## 2021-07-28 RX ORDER — GALCANEZUMAB 120 MG/ML
120 INJECTION, SOLUTION SUBCUTANEOUS
Qty: 1 EACH | Refills: 3 | Status: SHIPPED | OUTPATIENT
Start: 2021-07-28 | End: 2021-10-26

## 2021-07-28 NOTE — PROGRESS NOTES
Tasneem Dub [address]  [address]  [phone]          Established  Follow Up Visit       Date: July 28, 2021  Patient Name: Humaira Bliss   MRN: [id number]  Primary physician: [facility],Fourth Floor appearance: Well appearing, and in no acute distress  Skin: skin color, texture normal.  No rashes or lesions. Head: Normocephalic, atraumatic.     Neurological exam:    Mental Status:   Attention/Concentration: intact attention on bedside test   Fund of stroke, and MI. The side effect profile is also favorable, being limited to site injection pain, muscle spasm, and constipation. The latter is seen with Aimovig, specifically, but not Ajovy or Emgality.   These medications are also favorable in that there Staff Physician, Neurology   7/28/2021  7:38 AM

## 2021-07-28 NOTE — PATIENT INSTRUCTIONS
-Emgality  2 pens x 1 for initial dosing (2 consecutive shots for 1st month) then,   Emgality 1 pens x 3 refills for monthly dosing    administered subcutaneously in thigh, upper arm, or buttocks   -Follow up in 3 months or sooner if needed.     -If you dev

## 2021-08-02 ENCOUNTER — TELEPHONE (OUTPATIENT)
Dept: NEUROLOGY | Facility: CLINIC | Age: 51
End: 2021-08-02

## 2021-08-02 DIAGNOSIS — G43.019 INTRACTABLE MIGRAINE WITHOUT AURA AND WITHOUT STATUS MIGRAINOSUS: ICD-10-CM

## 2021-08-03 RX ORDER — RIZATRIPTAN BENZOATE 10 MG/1
TABLET ORAL
Qty: 12 TABLET | Refills: 3 | Status: SHIPPED | OUTPATIENT
Start: 2021-08-03

## 2021-08-03 NOTE — TELEPHONE ENCOUNTER
Refill request for rizatriptan 10 mg, take 1 tab at onset of migraine, #12, 1 refill    LOV: 7/28/21  NOV: none  Last refilled on 5/25/21

## 2021-08-06 ENCOUNTER — TELEPHONE (OUTPATIENT)
Dept: NEUROLOGY | Facility: CLINIC | Age: 51
End: 2021-08-06

## 2021-08-06 NOTE — TELEPHONE ENCOUNTER
Spoke with Rafaela who states that PA for Emgality loading dose (2 syringes) is needed. Completed PA for loading dose through cover my meds. Determination will be faxed to office.

## 2021-08-06 NOTE — TELEPHONE ENCOUNTER
Insurance will cover this medication but only  For 1 shot a month. Prior authorization will need to be   \"tweaked\" according to Pharmacy.   Galcanezumab-gnlm (EMGALITY) 120 MG/ML Subcutaneous Solution Prefilled Syringe

## 2021-08-09 NOTE — TELEPHONE ENCOUNTER
Fax received from [employer] for PA Emgality 120mg/ml    Emgality 120mg/ml was approved from 8/6/2021-7/31/2022 (Approvrd qty: up to 2 syringes for the 1st month, then up to 1 syringe every 28 days thereafter).      Case # [id number]    Fax placed in

## 2022-05-09 ENCOUNTER — OFFICE VISIT (OUTPATIENT)
Dept: INTERNAL MEDICINE CLINIC | Facility: CLINIC | Age: 52
End: 2022-05-09
Payer: COMMERCIAL

## 2022-05-09 ENCOUNTER — LAB ENCOUNTER (OUTPATIENT)
Dept: LAB | Age: 52
End: 2022-05-09
Attending: NURSE PRACTITIONER
Payer: COMMERCIAL

## 2022-05-09 ENCOUNTER — TELEPHONE (OUTPATIENT)
Dept: FAMILY MEDICINE CLINIC | Facility: CLINIC | Age: 52
End: 2022-05-09

## 2022-05-09 VITALS
HEIGHT: 64 IN | DIASTOLIC BLOOD PRESSURE: 60 MMHG | WEIGHT: 150 LBS | SYSTOLIC BLOOD PRESSURE: 91 MMHG | BODY MASS INDEX: 25.61 KG/M2 | HEART RATE: 102 BPM

## 2022-05-09 DIAGNOSIS — M54.50 LOW BACK PAIN WITHOUT SCIATICA, UNSPECIFIED BACK PAIN LATERALITY, UNSPECIFIED CHRONICITY: Primary | ICD-10-CM

## 2022-05-09 DIAGNOSIS — R14.0 ABDOMINAL BLOATING: ICD-10-CM

## 2022-05-09 DIAGNOSIS — R10.10 UPPER ABDOMINAL PAIN: ICD-10-CM

## 2022-05-09 LAB
ALBUMIN SERPL-MCNC: 4.1 G/DL (ref 3.4–5)
ALBUMIN/GLOB SERPL: 1.2 {RATIO} (ref 1–2)
ALP LIVER SERPL-CCNC: 80 U/L
ALT SERPL-CCNC: 20 U/L
ANION GAP SERPL CALC-SCNC: 4 MMOL/L (ref 0–18)
APPEARANCE: CLEAR
AST SERPL-CCNC: 16 U/L (ref 15–37)
BASOPHILS # BLD AUTO: 0.05 X10(3) UL (ref 0–0.2)
BASOPHILS NFR BLD AUTO: 0.7 %
BILIRUB SERPL-MCNC: 0.4 MG/DL (ref 0.1–2)
BILIRUBIN: NEGATIVE
BUN BLD-MCNC: 11 MG/DL (ref 7–18)
BUN/CREAT SERPL: 15.3 (ref 10–20)
CALCIUM BLD-MCNC: 9.1 MG/DL (ref 8.5–10.1)
CHLORIDE SERPL-SCNC: 105 MMOL/L (ref 98–112)
CO2 SERPL-SCNC: 30 MMOL/L (ref 21–32)
CREAT BLD-MCNC: 0.72 MG/DL
DEPRECATED RDW RBC AUTO: 40.5 FL (ref 35.1–46.3)
EOSINOPHIL # BLD AUTO: 0.08 X10(3) UL (ref 0–0.7)
EOSINOPHIL NFR BLD AUTO: 1.2 %
ERYTHROCYTE [DISTWIDTH] IN BLOOD BY AUTOMATED COUNT: 12.4 % (ref 11–15)
FASTING STATUS PATIENT QL REPORTED: NO
GLOBULIN PLAS-MCNC: 3.5 G/DL (ref 2.8–4.4)
GLUCOSE (URINE DIPSTICK): NEGATIVE MG/DL
GLUCOSE BLD-MCNC: 91 MG/DL (ref 70–99)
HCT VFR BLD AUTO: 40.4 %
HGB BLD-MCNC: 13 G/DL
IMM GRANULOCYTES # BLD AUTO: 0.02 X10(3) UL (ref 0–1)
IMM GRANULOCYTES NFR BLD: 0.3 %
KETONES (URINE DIPSTICK): NEGATIVE MG/DL
LEUKOCYTES: NEGATIVE
LIPASE SERPL-CCNC: 192 U/L (ref 73–393)
LYMPHOCYTES # BLD AUTO: 1.88 X10(3) UL (ref 1–4)
LYMPHOCYTES NFR BLD AUTO: 27.2 %
MCH RBC QN AUTO: 29.1 PG (ref 26–34)
MCHC RBC AUTO-ENTMCNC: 32.2 G/DL (ref 31–37)
MCV RBC AUTO: 90.4 FL
MONOCYTES # BLD AUTO: 0.5 X10(3) UL (ref 0.1–1)
MONOCYTES NFR BLD AUTO: 7.2 %
MULTISTIX LOT#: NORMAL NUMERIC
NEUTROPHILS # BLD AUTO: 4.38 X10 (3) UL (ref 1.5–7.7)
NEUTROPHILS # BLD AUTO: 4.38 X10(3) UL (ref 1.5–7.7)
NEUTROPHILS NFR BLD AUTO: 63.4 %
NITRITE, URINE: NEGATIVE
OCCULT BLOOD: NEGATIVE
OSMOLALITY SERPL CALC.SUM OF ELEC: 287 MOSM/KG (ref 275–295)
PH, URINE: 6 (ref 4.5–8)
PLATELET # BLD AUTO: 297 10(3)UL (ref 150–450)
POTASSIUM SERPL-SCNC: 3.9 MMOL/L (ref 3.5–5.1)
PROT SERPL-MCNC: 7.6 G/DL (ref 6.4–8.2)
PROTEIN (URINE DIPSTICK): NEGATIVE MG/DL
RBC # BLD AUTO: 4.47 X10(6)UL
SODIUM SERPL-SCNC: 139 MMOL/L (ref 136–145)
SPECIFIC GRAVITY: 1.01 (ref 1–1.03)
URINE-COLOR: YELLOW
UROBILINOGEN,SEMI-QN: 0.2 MG/DL (ref 0–1.9)
WBC # BLD AUTO: 6.9 X10(3) UL (ref 4–11)

## 2022-05-09 PROCEDURE — 36415 COLL VENOUS BLD VENIPUNCTURE: CPT

## 2022-05-09 PROCEDURE — 85025 COMPLETE CBC W/AUTO DIFF WBC: CPT

## 2022-05-09 PROCEDURE — 3074F SYST BP LT 130 MM HG: CPT | Performed by: NURSE PRACTITIONER

## 2022-05-09 PROCEDURE — 83690 ASSAY OF LIPASE: CPT

## 2022-05-09 PROCEDURE — 99213 OFFICE O/P EST LOW 20 MIN: CPT | Performed by: NURSE PRACTITIONER

## 2022-05-09 PROCEDURE — 80053 COMPREHEN METABOLIC PANEL: CPT

## 2022-05-09 PROCEDURE — 81002 URINALYSIS NONAUTO W/O SCOPE: CPT | Performed by: NURSE PRACTITIONER

## 2022-05-09 PROCEDURE — 3008F BODY MASS INDEX DOCD: CPT | Performed by: NURSE PRACTITIONER

## 2022-05-09 PROCEDURE — 3078F DIAST BP <80 MM HG: CPT | Performed by: NURSE PRACTITIONER

## 2022-05-09 PROCEDURE — 83013 H PYLORI (C-13) BREATH: CPT | Performed by: NURSE PRACTITIONER

## 2022-05-09 RX ORDER — OMEPRAZOLE 40 MG/1
40 CAPSULE, DELAYED RELEASE ORAL DAILY
Qty: 90 CAPSULE | Refills: 0 | Status: SHIPPED | OUTPATIENT
Start: 2022-05-09 | End: 2022-08-07

## 2022-05-09 RX ORDER — MULTIVIT WITH MINERALS/LUTEIN
1000 TABLET ORAL DAILY
COMMUNITY

## 2022-05-09 NOTE — TELEPHONE ENCOUNTER
----- Message from Afshin Blank RN sent at 5/9/2022 10:26 AM CDT -----  Regarding: FW: Back & upper stomach pain      ----- Message -----  From: Ellen Sandoval  Sent: 5/9/2022   8:54 AM CDT  To: Em Rn Triage  Subject: Back & upper stomach pain                        Good morning. Just to follow up, I am still feeling awful. I can be reached at [phone], my cell. Thank you            Dr Dragan Chu- can I please be seen Monday 5/9? I thought I had strained my back at work about 2 weeks ago. There is a ton of lifting up to 50lbs which is normal. But the past week and particularly the past few days, I now have very bloated upper stomach which is never like this, amd so tender to press on it. Something else must be wrong. I have been working 80-90  hours per week still. Was gping to go to urgent care now as I just got off work but they closed at 3:30. I dont have the $ to go to emergency room & not sure if emergency or. Just super strained. Today pain is worst it has been. Hoping I can fit into the schedule. Thank you.

## 2022-05-09 NOTE — TELEPHONE ENCOUNTER
Verified name and . Patient states she strained her back at work over 2 weeks ago- that they lift over 50 pounds a day so straining her back is common. She is requesting appointment to be assessed today. Appointment scheduled:  Future Appointments   Date Time Provider Chrissy Ricci   2022  3:30 PM RODRI Shaver     Information given:    [address]

## 2022-05-10 ENCOUNTER — HOSPITAL ENCOUNTER (OUTPATIENT)
Dept: CT IMAGING | Facility: HOSPITAL | Age: 52
Discharge: HOME | End: 2022-05-10
Attending: NURSE PRACTITIONER
Payer: COMMERCIAL

## 2022-05-10 ENCOUNTER — TELEPHONE (OUTPATIENT)
Dept: INTERNAL MEDICINE CLINIC | Facility: CLINIC | Age: 52
End: 2022-05-10

## 2022-05-10 DIAGNOSIS — R10.10 UPPER ABDOMINAL PAIN: ICD-10-CM

## 2022-05-10 DIAGNOSIS — R14.0 ABDOMINAL BLOATING: ICD-10-CM

## 2022-05-10 PROCEDURE — 74176 CT ABD & PELVIS W/O CONTRAST: CPT | Performed by: NURSE PRACTITIONER

## 2022-05-10 NOTE — TELEPHONE ENCOUNTER
Called for prior authorization and was told no prio auth was needed for Ct abd/pelvis  with confirmation [id number]

## 2022-05-12 LAB — H. PYLORI BREATH TEST: NEGATIVE

## 2022-05-27 ENCOUNTER — TELEPHONE (OUTPATIENT)
Dept: FAMILY MEDICINE CLINIC | Facility: CLINIC | Age: 52
End: 2022-05-27

## 2022-05-27 DIAGNOSIS — M54.9 CHRONIC UPPER BACK PAIN: Primary | ICD-10-CM

## 2022-05-27 DIAGNOSIS — G89.29 CHRONIC UPPER BACK PAIN: Primary | ICD-10-CM

## 2022-05-27 NOTE — TELEPHONE ENCOUNTER
Patient returning call and is still having severe upper back pain to the middle back to the front near the sternum and then she develops abdominal bloating. She is asking if there are any imaging that can be ordered for the back (see note below). Per patient the pain is so bad that it takes her breath away. Per patient, she's had a CT recently already but needs to figure out what this is. Shelly Jain, please advise.

## 2022-05-28 NOTE — TELEPHONE ENCOUNTER
I ordered an x-ray of the thoracic spine. She has had imaging completed of her cervical spine and seen Dr Jeanmarie Melgoza in the past.   Based on the imaging results it may be beneficial for her follow up with Dr Jeanmarie Melgoza again.

## 2022-06-01 ENCOUNTER — HOSPITAL ENCOUNTER (OUTPATIENT)
Dept: GENERAL RADIOLOGY | Age: 52
Discharge: HOME | End: 2022-06-01
Attending: NURSE PRACTITIONER
Payer: COMMERCIAL

## 2022-06-01 DIAGNOSIS — M54.9 CHRONIC UPPER BACK PAIN: ICD-10-CM

## 2022-06-01 DIAGNOSIS — G89.29 CHRONIC UPPER BACK PAIN: ICD-10-CM

## 2022-06-01 PROCEDURE — 72072 X-RAY EXAM THORAC SPINE 3VWS: CPT | Performed by: NURSE PRACTITIONER

## 2022-06-02 ENCOUNTER — PATIENT MESSAGE (OUTPATIENT)
Dept: FAMILY MEDICINE CLINIC | Facility: CLINIC | Age: 52
End: 2022-06-02

## 2022-06-02 DIAGNOSIS — M54.9 MID BACK PAIN: Primary | ICD-10-CM

## 2022-06-03 ENCOUNTER — HOSPITAL ENCOUNTER (OUTPATIENT)
Age: 52
Discharge: HOME | End: 2022-06-03
Payer: COMMERCIAL

## 2022-06-03 VITALS
TEMPERATURE: 99 F | DIASTOLIC BLOOD PRESSURE: 74 MMHG | OXYGEN SATURATION: 100 % | HEART RATE: 97 BPM | SYSTOLIC BLOOD PRESSURE: 122 MMHG | RESPIRATION RATE: 18 BRPM

## 2022-06-03 DIAGNOSIS — M54.9 MID BACK PAIN: Primary | ICD-10-CM

## 2022-06-03 PROCEDURE — 99203 OFFICE O/P NEW LOW 30 MIN: CPT

## 2022-06-03 PROCEDURE — 99213 OFFICE O/P EST LOW 20 MIN: CPT

## 2022-06-03 RX ORDER — LIDOCAINE 50 MG/G
2 PATCH TOPICAL EVERY 24 HOURS
Qty: 28 PATCH | Refills: 0 | Status: SHIPPED | OUTPATIENT
Start: 2022-06-03 | End: 2022-06-17

## 2022-06-03 NOTE — ED INITIAL ASSESSMENT (HPI)
PATIENT ARRIVED AMBULATORY TO ROOM C/O SYMPTOMS THAT STARTED 4 WEEKS AGO. PATIENT HAS HAD MULTIPLE WORK UPS WITHIN THAT TIME. PATIENT CONCERNED ABOUT A POSSIBLE BACK STRAIN. +MID BACK PAIN. PATIENT STATES SHE USUALLY LIFTS APPROXIMATELY 50 POUNDS A DAY AT WORK. NO NUMBNESS/TINGLING TO LEGS.

## 2022-06-10 ENCOUNTER — TELEPHONE (OUTPATIENT)
Dept: PHYSICAL MEDICINE AND REHAB | Facility: CLINIC | Age: 52
End: 2022-06-10

## 2022-12-07 DIAGNOSIS — G43.019 INTRACTABLE MIGRAINE WITHOUT AURA AND WITHOUT STATUS MIGRAINOSUS: ICD-10-CM

## 2022-12-07 RX ORDER — RIZATRIPTAN BENZOATE 10 MG/1
TABLET ORAL
Qty: 12 TABLET | Refills: 0 | OUTPATIENT
Start: 2022-12-07

## 2023-01-16 ENCOUNTER — TELEPHONE (OUTPATIENT)
Dept: FAMILY MEDICINE CLINIC | Facility: CLINIC | Age: 53
End: 2023-01-16

## 2023-01-16 DIAGNOSIS — Z00.00 ROUTINE MEDICAL EXAM: Primary | ICD-10-CM

## 2023-01-16 DIAGNOSIS — E55.9 VITAMIN D DEFICIENCY: ICD-10-CM

## 2023-01-16 DIAGNOSIS — Z12.31 ENCOUNTER FOR SCREENING MAMMOGRAM FOR MALIGNANT NEOPLASM OF BREAST: ICD-10-CM

## 2023-01-16 NOTE — TELEPHONE ENCOUNTER
Patient is scheduled for her yearly check up 04/19/23 with Dr. Juan Mccarty and would like to get blood work done prior to appointment and requesting orders. Please advise.

## 2023-01-30 NOTE — TELEPHONE ENCOUNTER
Impression:  WET AMD Right x '20 inj Eylea OD
   SCAR is limiting issue for yrs Plan: hx: [[WET macular degen. NEW NOV '20 Avastin - RIGHT eye - POOR response - moved to [facility] IMPROVED (see Nov '20 images) BUT reminded pt: Fibrosis may be limiting  - improved w inj but LIMITED w SCAR ]]. TODAY  EyLea injx OD (proc note) RTC 7-8w  dil, Colors, eval - h/o EYLEA OD     Future HRA?  
AGAIN NOV '22 -- REVIEWED images from over YEARS and [address] [facility] (prior CF / 20/500) and REMINDED pt that Fibrosis  may be limiting -- IMPROVED w injx but LIMITED) The CT she had does not show a lot of detail about her brain.   Should be able to see very well into her nose and we can discuss her breathing through her nose tomorrow

## 2023-02-02 ENCOUNTER — NURSE TRIAGE (OUTPATIENT)
Dept: FAMILY MEDICINE CLINIC | Facility: CLINIC | Age: 53
End: 2023-02-02

## 2023-02-02 ENCOUNTER — LAB ENCOUNTER (OUTPATIENT)
Dept: LAB | Age: 53
End: 2023-02-02
Attending: FAMILY MEDICINE
Payer: COMMERCIAL

## 2023-02-02 DIAGNOSIS — Z00.00 ROUTINE MEDICAL EXAM: ICD-10-CM

## 2023-02-02 DIAGNOSIS — E55.9 VITAMIN D DEFICIENCY: ICD-10-CM

## 2023-02-02 LAB
ALBUMIN SERPL-MCNC: 4.2 G/DL (ref 3.4–5)
ALBUMIN/GLOB SERPL: 1.3 {RATIO} (ref 1–2)
ALP LIVER SERPL-CCNC: 91 U/L
ALT SERPL-CCNC: 21 U/L
ANION GAP SERPL CALC-SCNC: 6 MMOL/L (ref 0–18)
AST SERPL-CCNC: 15 U/L (ref 15–37)
BASOPHILS # BLD AUTO: 0.07 X10(3) UL (ref 0–0.2)
BASOPHILS NFR BLD AUTO: 1 %
BILIRUB SERPL-MCNC: 0.5 MG/DL (ref 0.1–2)
BUN BLD-MCNC: 11 MG/DL (ref 7–18)
BUN/CREAT SERPL: 15.3 (ref 10–20)
CALCIUM BLD-MCNC: 9 MG/DL (ref 8.5–10.1)
CHLORIDE SERPL-SCNC: 105 MMOL/L (ref 98–112)
CHOLEST SERPL-MCNC: 172 MG/DL (ref ?–200)
CO2 SERPL-SCNC: 29 MMOL/L (ref 21–32)
CREAT BLD-MCNC: 0.72 MG/DL
DEPRECATED RDW RBC AUTO: 41.7 FL (ref 35.1–46.3)
EOSINOPHIL # BLD AUTO: 0.06 X10(3) UL (ref 0–0.7)
EOSINOPHIL NFR BLD AUTO: 0.8 %
ERYTHROCYTE [DISTWIDTH] IN BLOOD BY AUTOMATED COUNT: 12.6 % (ref 11–15)
FASTING PATIENT LIPID ANSWER: YES
FASTING STATUS PATIENT QL REPORTED: YES
GFR SERPLBLD BASED ON 1.73 SQ M-ARVRAT: 101 ML/MIN/1.73M2 (ref 60–?)
GLOBULIN PLAS-MCNC: 3.2 G/DL (ref 2.8–4.4)
GLUCOSE BLD-MCNC: 95 MG/DL (ref 70–99)
HCT VFR BLD AUTO: 42.6 %
HDLC SERPL-MCNC: 75 MG/DL (ref 40–59)
HGB BLD-MCNC: 14 G/DL
IMM GRANULOCYTES # BLD AUTO: 0.02 X10(3) UL (ref 0–1)
IMM GRANULOCYTES NFR BLD: 0.3 %
LDLC SERPL CALC-MCNC: 84 MG/DL (ref ?–100)
LYMPHOCYTES # BLD AUTO: 1.67 X10(3) UL (ref 1–4)
LYMPHOCYTES NFR BLD AUTO: 23 %
MCH RBC QN AUTO: 29.4 PG (ref 26–34)
MCHC RBC AUTO-ENTMCNC: 32.9 G/DL (ref 31–37)
MCV RBC AUTO: 89.5 FL
MONOCYTES # BLD AUTO: 0.38 X10(3) UL (ref 0.1–1)
MONOCYTES NFR BLD AUTO: 5.2 %
NEUTROPHILS # BLD AUTO: 5.05 X10 (3) UL (ref 1.5–7.7)
NEUTROPHILS # BLD AUTO: 5.05 X10(3) UL (ref 1.5–7.7)
NEUTROPHILS NFR BLD AUTO: 69.7 %
NONHDLC SERPL-MCNC: 97 MG/DL (ref ?–130)
OSMOLALITY SERPL CALC.SUM OF ELEC: 289 MOSM/KG (ref 275–295)
PLATELET # BLD AUTO: 290 10(3)UL (ref 150–450)
POTASSIUM SERPL-SCNC: 3.8 MMOL/L (ref 3.5–5.1)
PROT SERPL-MCNC: 7.4 G/DL (ref 6.4–8.2)
RBC # BLD AUTO: 4.76 X10(6)UL
SODIUM SERPL-SCNC: 140 MMOL/L (ref 136–145)
TRIGL SERPL-MCNC: 68 MG/DL (ref 30–149)
TSI SER-ACNC: 0.71 MIU/ML (ref 0.36–3.74)
VIT B12 SERPL-MCNC: 409 PG/ML (ref 193–986)
VIT D+METAB SERPL-MCNC: 23.9 NG/ML (ref 30–100)
VLDLC SERPL CALC-MCNC: 11 MG/DL (ref 0–30)
WBC # BLD AUTO: 7.3 X10(3) UL (ref 4–11)

## 2023-02-02 PROCEDURE — 82306 VITAMIN D 25 HYDROXY: CPT

## 2023-02-02 PROCEDURE — 80053 COMPREHEN METABOLIC PANEL: CPT

## 2023-02-02 PROCEDURE — 80061 LIPID PANEL: CPT

## 2023-02-02 PROCEDURE — 36415 COLL VENOUS BLD VENIPUNCTURE: CPT

## 2023-02-02 PROCEDURE — 85025 COMPLETE CBC W/AUTO DIFF WBC: CPT

## 2023-02-02 PROCEDURE — 84443 ASSAY THYROID STIM HORMONE: CPT

## 2023-02-02 PROCEDURE — 82607 VITAMIN B-12: CPT

## 2023-02-02 NOTE — PROGRESS NOTES
HI [employer] look great except mildly decreased vitamin D levels.  Please take extra vitamin D 2000 units daily. - Dr. Lee Schools

## 2023-02-03 ENCOUNTER — LAB ENCOUNTER (OUTPATIENT)
Dept: LAB | Age: 53
End: 2023-02-03
Payer: COMMERCIAL

## 2023-02-03 DIAGNOSIS — R07.9 CHEST PAIN, UNSPECIFIED TYPE: ICD-10-CM

## 2023-02-03 PROBLEM — F41.9 ANXIETY: Status: ACTIVE | Noted: 2023-02-03

## 2023-02-03 PROCEDURE — 93010 ELECTROCARDIOGRAM REPORT: CPT | Performed by: INTERNAL MEDICINE

## 2023-02-03 PROCEDURE — 93005 ELECTROCARDIOGRAM TRACING: CPT

## 2023-02-04 LAB
ATRIAL RATE: 69 BPM
P AXIS: 74 DEGREES
P-R INTERVAL: 144 MS
Q-T INTERVAL: 414 MS
QRS DURATION: 80 MS
QTC CALCULATION (BEZET): 443 MS
R AXIS: 66 DEGREES
T AXIS: 55 DEGREES
VENTRICULAR RATE: 69 BPM

## 2023-02-07 ENCOUNTER — TELEPHONE (OUTPATIENT)
Dept: FAMILY MEDICINE CLINIC | Facility: CLINIC | Age: 53
End: 2023-02-07

## 2023-02-18 ENCOUNTER — DOCUMENTATION ONLY (OUTPATIENT)
Dept: FAMILY MEDICINE CLINIC | Facility: CLINIC | Age: 53
End: 2023-02-18

## 2023-04-08 ENCOUNTER — PATIENT MESSAGE (OUTPATIENT)
Dept: FAMILY MEDICINE CLINIC | Facility: CLINIC | Age: 53
End: 2023-04-08

## 2023-04-19 ENCOUNTER — LAB ENCOUNTER (OUTPATIENT)
Dept: LAB | Age: 53
End: 2023-04-19
Attending: FAMILY MEDICINE
Payer: COMMERCIAL

## 2023-04-19 DIAGNOSIS — Z00.00 ROUTINE MEDICAL EXAM: ICD-10-CM

## 2023-04-19 LAB — FSH SERPL-ACNC: 116.3 MIU/ML

## 2023-04-19 PROCEDURE — 36415 COLL VENOUS BLD VENIPUNCTURE: CPT

## 2023-04-19 PROCEDURE — 82671 ASSAY OF ESTROGENS: CPT

## 2023-04-19 PROCEDURE — 83001 ASSAY OF GONADOTROPIN (FSH): CPT

## 2023-04-24 LAB
ESTRADIOL: <5 PG/ML
ESTRONE: 23 PG/ML

## 2023-07-24 ENCOUNTER — TELEPHONE (OUTPATIENT)
Dept: FAMILY MEDICINE CLINIC | Facility: CLINIC | Age: 53
End: 2023-07-24

## 2023-07-24 NOTE — TELEPHONE ENCOUNTER
Zita Cook from [facility] is calling and states the need an order for a screening mammogram ASAP.     Fax# [phone]

## 2023-09-19 ENCOUNTER — TELEPHONE (OUTPATIENT)
Dept: GASTROENTEROLOGY | Facility: CLINIC | Age: 53
End: 2023-09-19

## 2023-09-19 ENCOUNTER — OFFICE VISIT (OUTPATIENT)
Dept: GASTROENTEROLOGY | Facility: CLINIC | Age: 53
End: 2023-09-19

## 2023-09-19 VITALS
DIASTOLIC BLOOD PRESSURE: 66 MMHG | BODY MASS INDEX: 25.61 KG/M2 | WEIGHT: 150 LBS | HEART RATE: 82 BPM | HEIGHT: 64 IN | SYSTOLIC BLOOD PRESSURE: 104 MMHG

## 2023-09-19 DIAGNOSIS — Z12.11 COLON CANCER SCREENING: Primary | ICD-10-CM

## 2023-09-19 DIAGNOSIS — Z12.11 SCREEN FOR COLON CANCER: Primary | ICD-10-CM

## 2023-09-19 PROCEDURE — 3008F BODY MASS INDEX DOCD: CPT | Performed by: INTERNAL MEDICINE

## 2023-09-19 PROCEDURE — 3078F DIAST BP <80 MM HG: CPT | Performed by: INTERNAL MEDICINE

## 2023-09-19 PROCEDURE — 3074F SYST BP LT 130 MM HG: CPT | Performed by: INTERNAL MEDICINE

## 2023-09-19 PROCEDURE — S0285 CNSLT BEFORE SCREEN COLONOSC: HCPCS | Performed by: INTERNAL MEDICINE

## 2023-09-19 RX ORDER — SODIUM, POTASSIUM,MAG SULFATES 17.5-3.13G
SOLUTION, RECONSTITUTED, ORAL ORAL
Qty: 1 EACH | Refills: 0 | Status: SHIPPED | OUTPATIENT
Start: 2023-09-19

## 2023-09-19 NOTE — PATIENT INSTRUCTIONS
1. Schedule colonoscopy with MAC [Diagnosis: CRC screening]. 2.  bowel prep from pharmacy (split dose suprep). If your prescription is not available and/or is cost-prohibitive, please contact the office as soon as possible to ensure you receive a bowel prep before your procedure. 3. Continue all medications for procedure. 4. Read all bowel prep instructions carefully. 5. AVOID seeds, nuts, popcorn, and raw fruits and vegetables (cooked is okay) for 2-3 days before procedure. 6. If you start any NEW medication after your visit today, please notify us. Certain medications will need to be held before the procedure or the procedure cannot be performed. 7. You will need a ride home from your procedure since you are receiving sedation. Please ensure you will have an available ride home or the procedure cannot be performed.

## 2023-09-19 NOTE — TELEPHONE ENCOUNTER
Scheduled for:  Colonoscopy [id number]  Provider Name:  Dr. Nancy Villegas  Date:  11/29/2023  Location:  [facility]  Sedation:  MAC  Time:  9:00 am, (pt is aware that [facility] will call the day before to confirm arrival time)  Prep: Suprep    Meds/Allergies Reconciled?:  Physician reviewed  Diagnosis with codes:  Screening for colon cancer Z12.11  Was patient informed to call insurance with codes (Y/N):  Yes  Referral sent?:  Referral was sent at the time of electronic surgical scheduling. [facility] or [address]  notified?:  I sent an electronic request to Endo Scheduling and received a confirmation today. Medication Orders:  Pt is aware to NOT take iron pills, herbal meds and diet supplements for 7 days before exam. Also to NOT take any form of alcohol, recreational drugs and any forms of ED meds 24 hours before exam.   Misc Orders:  N/A     Further instructions given by staff:  Prep instructions were given to pt in the office, pt verbalized understanding.

## 2023-09-21 ENCOUNTER — MED REC SCAN ONLY (OUTPATIENT)
Dept: FAMILY MEDICINE CLINIC | Facility: CLINIC | Age: 53
End: 2023-09-21

## 2023-09-27 ENCOUNTER — PATIENT MESSAGE (OUTPATIENT)
Dept: OTHER | Age: 53
End: 2023-09-27

## 2023-09-27 DIAGNOSIS — M25.519 CHRONIC SHOULDER PAIN, UNSPECIFIED LATERALITY: ICD-10-CM

## 2023-09-27 DIAGNOSIS — R93.6 ABNORMAL MRI, SHOULDER: Primary | ICD-10-CM

## 2023-09-27 DIAGNOSIS — G89.29 CHRONIC SHOULDER PAIN, UNSPECIFIED LATERALITY: ICD-10-CM

## 2023-10-01 NOTE — TELEPHONE ENCOUNTER
From: Jareth Cook  Sent: 10/1/2023 12:15 PM CDT  To: Em Rn Triage  Subject: MRI results    Thank you very much for the update. Will do. Also, did my Mammogram results/report come to your office? I have called a few times to be sure they sent it from [facility]?

## 2023-10-01 NOTE — TELEPHONE ENCOUNTER
On site staff please locate [facility]ogram results [facility].

## 2023-11-02 ENCOUNTER — OFFICE VISIT (OUTPATIENT)
Dept: ORTHOPEDICS CLINIC | Facility: CLINIC | Age: 53
End: 2023-11-02
Payer: COMMERCIAL

## 2023-11-02 ENCOUNTER — HOSPITAL ENCOUNTER (OUTPATIENT)
Dept: GENERAL RADIOLOGY | Facility: HOSPITAL | Age: 53
Discharge: HOME | End: 2023-11-02
Attending: ORTHOPAEDIC SURGERY
Payer: COMMERCIAL

## 2023-11-02 VITALS — DIASTOLIC BLOOD PRESSURE: 67 MMHG | HEART RATE: 66 BPM | SYSTOLIC BLOOD PRESSURE: 104 MMHG

## 2023-11-02 DIAGNOSIS — M25.512 LEFT SHOULDER PAIN, UNSPECIFIED CHRONICITY: ICD-10-CM

## 2023-11-02 DIAGNOSIS — M75.30 CALCIFIC SUPRASPINATUS TENDONITIS: Primary | ICD-10-CM

## 2023-11-02 PROCEDURE — 20610 DRAIN/INJ JOINT/BURSA W/O US: CPT | Performed by: ORTHOPAEDIC SURGERY

## 2023-11-02 PROCEDURE — 3074F SYST BP LT 130 MM HG: CPT | Performed by: ORTHOPAEDIC SURGERY

## 2023-11-02 PROCEDURE — 73030 X-RAY EXAM OF SHOULDER: CPT | Performed by: ORTHOPAEDIC SURGERY

## 2023-11-02 PROCEDURE — 3078F DIAST BP <80 MM HG: CPT | Performed by: ORTHOPAEDIC SURGERY

## 2023-11-02 PROCEDURE — 99244 OFF/OP CNSLTJ NEW/EST MOD 40: CPT | Performed by: ORTHOPAEDIC SURGERY

## 2023-11-02 RX ORDER — TRIAMCINOLONE ACETONIDE 40 MG/ML
40 INJECTION, SUSPENSION INTRA-ARTICULAR; INTRAMUSCULAR ONCE
Status: COMPLETED | OUTPATIENT
Start: 2023-11-02 | End: 2023-11-02

## 2023-11-02 RX ADMIN — TRIAMCINOLONE ACETONIDE 40 MG: 40 INJECTION, SUSPENSION INTRA-ARTICULAR; INTRAMUSCULAR at 10:48:00

## 2023-11-02 NOTE — PROGRESS NOTES
NURSING INTAKE COMMENTS: Patient presents with:  Shoulder Pain: Consult- L shoulder - Pt is only able to raise up are half way, onset 3 months. Denies any injury. Rates pain 4-5/10. Pt has MRI 23 and XR 21. Pt declined new xr due to financial reasons. HPI: This 48year old female presents today with complaints of left shoulder pain. She has had progressive pain in the left shoulder for the past few months. No specific injury. She does a lot of physical labor. She works in a warehouse setting as a manager. She reports overhead pain but no mechanical clicking or popping. She does have some night pain. Majority the pain is over the lateral arm and shoulder. She takes ibuprofen with minimal improvement. She is right-hand dominant. Increased pain reaching behind the back is noted. No neck pain or numbness is noted. Past Medical History:   Diagnosis Date    Carpal tunnel syndrome     right hand    Cervicogenic headache     COVID-19 vaccine administered 2021    Endometriosis     Inflammatory arthritis     Migraine with aura      Past Surgical History:   Procedure Laterality Date    HYSTERECTOMY      ovaries still present    LAPAROSCOPIC                  OTHER SURGICAL HISTORY      wisdom teeth     Current Outpatient Medications   Medication Sig Dispense Refill    Conj Estrog-Medroxyprogest Ace 0.3-1.5 MG Oral Tab Take 1 tablet by mouth daily. 84 tablet 4    Na Sulfate-K Sulfate-Mg Sulf (SUPREP BOWEL PREP KIT) 17.5-3.13-1.6 GM/177ML Oral Solution Take as discussed in clinic 1 each 0    Rizatriptan Benzoate 10 MG Oral Tab take one tablet by mouth use at onset; may repeat once after 2 hours- ONLY 2 IN 24 HOUR PERIOD MAX. This is a 30 day supply. 12 tablet 3    ondansetron (ZOFRAN) 4 mg tablet 1-2 tablets as needed for nausea, no more than 4 tablets in 24 hours 30 tablet 3    cyclobenzaprine 5 MG Oral Tab Take 1 tablet (5 mg total) by mouth nightly.  (Patient taking differently: Take 1 tablet (5 mg total) by mouth as needed.) 30 tablet 0    Ascorbic Acid (VITAMIN C) 1000 MG Oral Tab Take 1 tablet (1,000 mg total) by mouth daily. pseudoephedrine 30 MG Oral Tab Take 2 tablets (60 mg total) by mouth daily. ibuprofen 200 MG Oral Tab Take 3 tablets (600 mg total) by mouth daily. DULoxetine 20 MG Oral Cap DR Particles Take 1 capsule (20 mg total) by mouth daily.  30 capsule 1       Codeine                   Morphine                  Vicodin [Hydrocodon*      Family History   Problem Relation Age of Onset    Cancer Maternal Grandmother 39        breast cancer    Breast Cancer Maternal Grandmother 39       Social History    Occupational History      Not on file    Tobacco Use      Smoking status: Never      Smokeless tobacco: Never    Vaping Use      Vaping Use: Never used    Substance and Sexual Activity      Alcohol use: Yes        Comment: Rarely      Drug use: No      Sexual activity: Not on file       Review of Systems:  GENERAL: denies fevers, chills, night sweats, fatigue, unintentional weight loss/gain  SKIN: denies skin lesions, open sores, rash  HEENT:denies recent vision change, new nasal congestion,hearing loss, tinnitus, sore throat, headaches  RESPIRATORY: denies new shortness of breath, cough, asthma, wheezing  CARDIOVASCULAR: denies chest pain, leg cramps with exertion, palpitations, leg swelling  GI: denies abdominal pain, nausea, vomiting, diarrhea, constipation, hematochezia, worsening heartburn or stomach ulcers  : denies dysuria, hematuria, incontinence, increased frequency, urgency, difficulty urinating  MUSCULOSKELETAL: denies musculoskeletal complaints other than in HPI  NEURO: denies numbness, tingling, weakness, balance issues, dizziness, memory loss  PSYCHIATRIC: denies Hx of depression, anxiety, other psychiatric disorders  HEMATOLOGIC: denies blood clots, anemia, blood clotting disorders, blood transfusion  ENDOCRINE: denies autoimmune disease, thyroid issues, or diabetes  ALLERGY: denies asthma, seasonal allergies    Physical Examination:    /67   Pulse 66   Constitutional: appears well hydrated, alert and responsive, no acute distress noted  Extremities: Cervical spine range of motion full. No exacerbation of symptoms with motion. Spurling sign negative. Examination of the left shoulder there is no obvious atrophy no prominence. Active forward flexion is to 100 degrees with pain. Passive flexion 120 degrees. Active and passive external rotation 60 degrees. Passive internal rotation 50 degrees with pain. Abduction and external rotation and belly press strength are 5 out of 5. Starr impingement sign strongly positive. Neer impingement sign strongly positive. Speed's Test negative. Crank sign equivocal.  Neurological: Left hand light touch and pinprick sensory exam normal. Lateral shoulder light touch and pinprick sensory examination normal.  strength,wrist extension, biceps, triceps, and shoulder abduction strength 5 out of 5. Connor sign negative. No obvious atrophy of the hand. Imaging:   X-ray left shoulder obtained in the office today shows a large calcification in the subacromial space consistent with calcific tendinitis of the supraspinatus. MRI left shoulder and report were reviewed. The MRI shows a calcific density in the subacromial space. No full-thickness tear noted.     Labs:  Lab Results   Component Value Date    WBC 7.3 02/02/2023    HGB 14.0 02/02/2023    .0 02/02/2023      Lab Results   Component Value Date    GLU 95 02/02/2023    BUN 11 02/02/2023    CREATSERUM 0.72 02/02/2023    GFR 94 07/30/2016    GFRNAA 97 05/09/2022    GFRAA 111 05/09/2022        Assessment and Plan:  Diagnoses and all orders for this visit:    Calcific supraspinatus tendonitis  -     Cancel: PHYSICAL THERAPY - INTERNAL  -     arthrocentesis major joint  -     triamcinolone acetonide (Kenalog-40) 40 MG/ML injection 40 mg  - PHYSICAL THERAPY - INTERNAL    Left shoulder pain, unspecified chronicity  -     XR SHOULDER, COMPLETE (MIN 2 VIEWS), LEFT (CPT=73030); Future        Assessment: Left shoulder calcific tendinitis supraspinatus, mild adhesive capsulitis    Plan: I recommend nonoperative care of her shoulder. The subacromial space was injected with 40 mg of Kenalog using a lateral approach. He is a 20-gauge needle and a peppering technique to the tendon insertion. I attempted to trephinate the calcific deposit. Recommend outpatient physical therapy and ibuprofen use. I discussed activity modifications. Follow-up again in 6 weeks. If symptoms persist, consider either an intra-articular injection or an arthroscopic procedure. Follow Up: Return in about 6 weeks (around 12/14/2023).     Thom Eddy MD

## 2023-11-02 NOTE — PROGRESS NOTES
Per verbal order from Dr. Sreedhar Zhu draw up 3ml of 0.5% Marcaine & 2ml 1% lidocaine and 1ml of Kenalog 40 for cortisone injection to left  shoulder Bessy Ochoa MA    Patient provided education handout for cortisone injection.

## 2023-11-20 ENCOUNTER — TELEPHONE (OUTPATIENT)
Facility: CLINIC | Age: 53
End: 2023-11-20

## 2023-11-20 NOTE — TELEPHONE ENCOUNTER
Spoke with patient and informed her that the procedure was still scheduled for 11/29/23 and no changes have been made. Patient was relieved and will wait for [facility]'s call for her arrival time.

## 2023-11-20 NOTE — TELEPHONE ENCOUNTER
Patient calling to make sure procedure does not get canceled she thought it was on different day. She can make it, please do not cancel wants to confirm, please call to make sure.

## 2023-12-07 ENCOUNTER — OFFICE VISIT (OUTPATIENT)
Dept: ORTHOPEDICS CLINIC | Facility: CLINIC | Age: 53
End: 2023-12-07
Payer: COMMERCIAL

## 2023-12-07 DIAGNOSIS — M75.02 ADHESIVE CAPSULITIS OF LEFT SHOULDER: Primary | ICD-10-CM

## 2023-12-07 DIAGNOSIS — M75.30 CALCIFIC SUPRASPINATUS TENDONITIS: ICD-10-CM

## 2023-12-07 PROCEDURE — 99213 OFFICE O/P EST LOW 20 MIN: CPT | Performed by: ORTHOPAEDIC SURGERY

## 2023-12-07 NOTE — PROGRESS NOTES
NURSING INTAKE COMMENTS:   Chief Complaint   Patient presents with    Shoulder Pain     Left f/u - had cortisone inj on 23 - states she is so much better - still has pain rated as 3/10 on and off - she will need home exercises - she dose not afford the PT - still has some stiffness        HPI: This 48year old female presents today with complaints of left shoulder pain follow-up. She reports significant improvement with respect to the shoulder. The injection given previously gave her significant relief. She has been able to do her job much more comfortably. She is taking intermittent ibuprofen. She did not follow through with any physical therapy due to financial concerns. Past Medical History:   Diagnosis Date    Carpal tunnel syndrome     right hand    Cervicogenic headache     COVID-19 vaccine administered 2021    Endometriosis     Inflammatory arthritis     Migraine with aura     PONV (postoperative nausea and vomiting)      Past Surgical History:   Procedure Laterality Date    HYSTERECTOMY      ovaries still present    LAPAROSCOPIC                  OTHER SURGICAL HISTORY      wisdom teeth     Current Outpatient Medications   Medication Sig Dispense Refill    Conj Estrog-Medroxyprogest Ace 0.3-1.5 MG Oral Tab Take 1 tablet by mouth daily. 84 tablet 4    DULoxetine 20 MG Oral Cap DR Particles Take 1 capsule (20 mg total) by mouth daily. 30 capsule 1    Rizatriptan Benzoate 10 MG Oral Tab take one tablet by mouth use at onset; may repeat once after 2 hours- ONLY 2 IN 24 HOUR PERIOD MAX. This is a 30 day supply. 12 tablet 3    ondansetron (ZOFRAN) 4 mg tablet 1-2 tablets as needed for nausea, no more than 4 tablets in 24 hours 30 tablet 3    cyclobenzaprine 5 MG Oral Tab Take 1 tablet (5 mg total) by mouth nightly. (Patient taking differently: Take 5 mg by mouth as needed.) 30 tablet 0    Ascorbic Acid (VITAMIN C) 1000 MG Oral Tab Take 1 tablet (1,000 mg total) by mouth daily. pseudoephedrine 30 MG Oral Tab Take 2 tablets (60 mg total) by mouth daily. ibuprofen 200 MG Oral Tab Take 3 tablets (600 mg total) by mouth daily. Allergies   Allergen Reactions    Adhesive Tape HIVES and SWELLING     Some paper tape ok , if not use coban     Codeine     Morphine     Vicodin [Hydrocodone-Acetaminophen]      Family History   Problem Relation Age of Onset    Cancer Maternal Grandmother 39        breast cancer    Breast Cancer Maternal Grandmother 39       Social History     Occupational History    Not on file   Tobacco Use    Smoking status: Never    Smokeless tobacco: Never   Vaping Use    Vaping Use: Never used   Substance and Sexual Activity    Alcohol use: Yes     Comment: Rarely    Drug use: No    Sexual activity: Not on file        Review of Systems:  GENERAL: denies fevers, chills, night sweats, fatigue, unintentional weight loss/gain  SKIN: denies skin lesions, open sores, rash  HEENT:denies recent vision change, new nasal congestion,hearing loss, tinnitus, sore throat, headaches  RESPIRATORY: denies new shortness of breath, cough, asthma, wheezing  CARDIOVASCULAR: denies chest pain, leg cramps with exertion, palpitations, leg swelling  GI: denies abdominal pain, nausea, vomiting, diarrhea, constipation, hematochezia, worsening heartburn or stomach ulcers  : denies dysuria, hematuria, incontinence, increased frequency, urgency, difficulty urinating  MUSCULOSKELETAL: denies musculoskeletal complaints other than in HPI  NEURO: denies numbness, tingling, weakness, balance issues, dizziness, memory loss  PSYCHIATRIC: denies Hx of depression, anxiety, other psychiatric disorders  HEMATOLOGIC: denies blood clots, anemia, blood clotting disorders, blood transfusion  ENDOCRINE: denies autoimmune disease, thyroid issues, or diabetes  ALLERGY: denies asthma, seasonal allergies    Physical Examination:    There were no vitals taken for this visit.   Constitutional: appears well hydrated, alert and responsive, no acute distress noted  Extremities: Left shoulder mildly tender over the anterior joint line. Active forward flexion 150 degrees. Active external rotation 70 degrees. Passive internal rotation 40 to 50 degrees with pain. Abduction and external rotation motor strength is 4+ out of 5. Starr impingement sign is positive. Neurological: Unchanged    Imaging:   No results found. Labs:  Lab Results   Component Value Date    WBC 7.3 02/02/2023    HGB 14.0 02/02/2023    .0 02/02/2023      Lab Results   Component Value Date    GLU 95 02/02/2023    BUN 11 02/02/2023    CREATSERUM 0.72 02/02/2023    GFR 94 07/30/2016    GFRNAA 97 05/09/2022    GFRAA 111 05/09/2022        Assessment and Plan:  Diagnoses and all orders for this visit:    Adhesive capsulitis of left shoulder    Calcific supraspinatus tendonitis        Assessment: Left shoulder calcific tendinitis rotator cuff, adhesive capsulitis    Plan: Overall her symptoms seem improved. Advised home stretching exercises for the capsule. Provided written instructions for capsular stretching and eventual strengthening. Follow-up again in 1 month. If range of motion deficits persist, would consider an intra-articular injection of Kenalog. Advised consistent use of ibuprofen and ice. Avoid lifting pushing and pulling with the extremity. Follow Up: Return in about 4 weeks (around 1/4/2024).     Ora Allen MD

## 2023-12-19 ENCOUNTER — PATIENT MESSAGE (OUTPATIENT)
Dept: NEUROLOGY | Facility: CLINIC | Age: 53
End: 2023-12-19

## 2023-12-19 ENCOUNTER — OFFICE VISIT (OUTPATIENT)
Dept: NEUROLOGY | Facility: CLINIC | Age: 53
End: 2023-12-19
Payer: COMMERCIAL

## 2023-12-19 VITALS — SYSTOLIC BLOOD PRESSURE: 102 MMHG | DIASTOLIC BLOOD PRESSURE: 66 MMHG

## 2023-12-19 DIAGNOSIS — R20.0 HAND NUMBNESS: ICD-10-CM

## 2023-12-19 DIAGNOSIS — G44.86 CERVICOGENIC HEADACHE: ICD-10-CM

## 2023-12-19 DIAGNOSIS — G43.709 CHRONIC MIGRAINE WITHOUT AURA WITHOUT STATUS MIGRAINOSUS, NOT INTRACTABLE: Primary | ICD-10-CM

## 2023-12-19 PROCEDURE — L3908 WHO COCK-UP NONMOLDE PRE OTS: HCPCS | Performed by: OTHER

## 2023-12-19 PROCEDURE — 99214 OFFICE O/P EST MOD 30 MIN: CPT | Performed by: OTHER

## 2023-12-19 PROCEDURE — 3074F SYST BP LT 130 MM HG: CPT | Performed by: OTHER

## 2023-12-19 PROCEDURE — 3078F DIAST BP <80 MM HG: CPT | Performed by: OTHER

## 2023-12-19 RX ORDER — ELETRIPTAN HYDROBROMIDE 40 MG/1
TABLET, FILM COATED ORAL
Qty: 8 TABLET | Refills: 0 | Status: SHIPPED | OUTPATIENT
Start: 2023-12-19

## 2023-12-19 RX ORDER — MULTIVIT-MIN/IRON/FOLIC ACID/K 18-600-40
CAPSULE ORAL
COMMUNITY

## 2023-12-19 RX ORDER — GALCANEZUMAB 120 MG/ML
INJECTION, SOLUTION SUBCUTANEOUS
Qty: 2 EACH | Refills: 0 | Status: SHIPPED | OUTPATIENT
Start: 2023-12-19 | End: 2024-01-16

## 2023-12-19 RX ORDER — CYCLOBENZAPRINE HCL 5 MG
TABLET ORAL NIGHTLY
Qty: 180 TABLET | Refills: 0 | Status: SHIPPED | OUTPATIENT
Start: 2023-12-19 | End: 2024-03-18

## 2023-12-19 RX ORDER — PROCHLORPERAZINE MALEATE 10 MG
10 TABLET ORAL EVERY 6 HOURS PRN
Qty: 90 TABLET | Refills: 0 | Status: SHIPPED | OUTPATIENT
Start: 2023-12-19 | End: 2024-01-18

## 2023-12-19 RX ORDER — GALCANEZUMAB 120 MG/ML
120 INJECTION, SOLUTION SUBCUTANEOUS
Qty: 1 ML | Refills: 3 | Status: SHIPPED | OUTPATIENT
Start: 2023-12-19 | End: 2024-01-16

## 2023-12-19 NOTE — PATIENT INSTRUCTIONS
PREVENTIVE   Emgality once monthly     RESCUE    Eletriptan -- if does not work once or twice let me know please. Take at FIRST sign of migraine.     Compazine/Benadryl together    May use Eletriptan + Compazine/Benadryl separately or together as headache cocktail   Do not drive if makes you sedated       Wrist splints every night      EMG/NCS of both hands

## 2023-12-19 NOTE — PROGRESS NOTES
[address], [address] [place] [address]  [phone]          Established  Follow Up Visit       Date: December 19, 2023  Patient Name: Lyric Denney   MRN: [id number]  Primary physician: [id number] [facility] [address]  [facility] [id number]    Interval History:   --PT was not affordable. She was using a TENS unit and neck massages. She has not tried [facility]. She notes her job causes her stress, leading to migraines. --Migraines: much worse the past 2 months. Work is more stressful than ever. Vomiting, photophobia. Has to sit in office with lights out. Wearing sunglasses more at work. Very sore in her neck, like her \"neck is fusing\" and stiff/painful. Several per week. Has chronic daily headache. --Notes numbness in her fingers when she wakes up, in her right hand > left hand     OUTPATIENT MEDICATIONS  Current Outpatient Medications on File Prior to Visit   Medication Sig Dispense Refill    Cholecalciferol (VITAMIN D) 50 MCG (2000 UT) Oral Cap Take by mouth. Conj Estrog-Medroxyprogest Ace 0.3-1.5 MG Oral Tab Take 1 tablet by mouth daily. 84 tablet 4    Rizatriptan Benzoate 10 MG Oral Tab take one tablet by mouth use at onset; may repeat once after 2 hours- ONLY 2 IN 24 HOUR PERIOD MAX. This is a 30 day supply. 12 tablet 3    ondansetron (ZOFRAN) 4 mg tablet 1-2 tablets as needed for nausea, no more than 4 tablets in 24 hours 30 tablet 3    cyclobenzaprine 5 MG Oral Tab Take 1 tablet (5 mg total) by mouth nightly. (Patient taking differently: Take 1 tablet (5 mg total) by mouth as needed.) 30 tablet 0    Ascorbic Acid (VITAMIN C) 1000 MG Oral Tab Take 1 tablet (1,000 mg total) by mouth daily. pseudoephedrine 30 MG Oral Tab Take 2 tablets (60 mg total) by mouth daily. ibuprofen 200 MG Oral Tab Take 3 tablets (600 mg total) by mouth daily.       DULoxetine 20 MG Oral Cap DR Particles Take 1 capsule (20 mg total) by mouth daily. 30 capsule 1     No current facility-administered medications on file prior to visit. PHYSICAL EXAM:   /66 (BP Location: Right arm, Patient Position: Sitting)   General appearance: Well appearing, and in no acute distress  Skin: skin color, texture normal.  No rashes or lesions. Head: Normocephalic, atraumatic.     Neurological exam:    Mental Status:   Attention/Concentration: intact attention on bedside test   Fund of knowledge: intact  Speech: no dysarthria or aphasia       LABS/DATA:  Component      Latest Ref Rn 2/2/2023   WBC      4.0 - 11.0 x10(3) uL 7.3    RBC      3.80 - 5.30 x10(6)uL 4.76    Hemoglobin      12.0 - 16.0 g/dL 14.0    Hematocrit      35.0 - 48.0 % 42.6    MCV      80.0 - 100.0 fL 89.5    MCH      26.0 - 34.0 pg 29.4    MCHC      31.0 - 37.0 g/dL 32.9    RDW-SD      35.1 - 46.3 fL 41.7    RDW      11.0 - 15.0 % 12.6    Platelet Count      967.2 - 450.0 10(3)uL 290.0    Prelim Neutrophil Abs      1.50 - 7.70 x10 (3) uL 5.05    Neutrophils Absolute      1.50 - 7.70 x10(3) uL 5.05    Lymphocytes Absolute      1.00 - 4.00 x10(3) uL 1.67    Monocytes Absolute      0.10 - 1.00 x10(3) uL 0.38    Eosinophils Absolute      0.00 - 0.70 x10(3) uL 0.06    Basophils Absolute      0.00 - 0.20 x10(3) uL 0.07    Immature Granulocyte Absolute      0.00 - 1.00 x10(3) uL 0.02    Neutrophils %      % 69.7    Lymphocytes %      % 23.0    Monocytes %      % 5.2    Eosinophils %      % 0.8    Basophils %      % 1.0    Immature Granulocyte %      % 0.3    Glucose      70 - 99 mg/dL 95    Sodium      136 - 145 mmol/L 140    Potassium      3.5 - 5.1 mmol/L 3.8    Chloride      98 - 112 mmol/L 105    Carbon Dioxide, Total      21.0 - 32.0 mmol/L 29.0    ANION GAP      0 - 18 mmol/L 6    BUN      7 - 18 mg/dL 11    CREATININE      0.55 - 1.02 mg/dL 0.72    BUN/CREATININE RATIO      10.0 - 20.0  15.3    CALCIUM      8.5 - 10.1 mg/dL 9.0    CALCULATED OSMOLALITY      275 - 295 mOsm/kg 289 EGFR      >=60 mL/min/1.73m2 101    ALT (SGPT)      13 - 56 U/L 21    AST (SGOT)      15 - 37 U/L 15    ALKALINE PHOSPHATASE      41 - 108 U/L 91    Total Bilirubin      0.1 - 2.0 mg/dL 0.5    PROTEIN, TOTAL      6.4 - 8.2 g/dL 7.4    Albumin      3.4 - 5.0 g/dL 4.2    Globulin      2.8 - 4.4 g/dL 3.2    A/G Ratio      1.0 - 2.0  1.3    Patient Fasting for CMP? Yes    Cholesterol, Total      <200 mg/dL 172    HDL Cholesterol      40 - 59 mg/dL 75 (H)    Triglycerides      30 - 149 mg/dL 68    LDL Cholesterol Calc      <100 mg/dL 84    VLDL      0 - 30 mg/dL 11    NON-HDL CHOLESTEROL      <130 mg/dL 97    Patient Fasting for Lipid? Yes    TSH      0.358 - 3.740 mIU/mL 0.708    Vitamin B12      193 - 986 pg/mL 409    VITAMIN D, 25-OH, TOTAL      30.0 - 100.0 ng/mL 23.9 (L)       Legend:  (H) High  (L) Low      IMAGING:  N/A    ASSESSMENT:  The patient is a 48year old woman with past medical history of migraine, endometriosis, carpal tunnel syndrome who presents for follow-up regarding chronic migraine with cervicogenic features which have worsened, work is a trigger for her. Her neurological examination was normal.     Chronic migraine without aura confirmed no aura today   Cervicogenic headache  -Preventative: Mg and B2, Emgality   Future considerations: Ajovy   -Abortive: Eletriptan + Compazine/Benadryl                 Future considerations: Ubrelvy, Nurtec                 Limit <2 times per week to avoid developing medication overuse headaches   -Neuroimaging: Hold for now in light of normal exam and similar characteristics of migraine  -Follow up: 3 months     Hand numbness r/o carpal tunnel syndrome   -EMG/NCS  -Wrist splints at night         Today we discussed Emgality/Galcanzeumab.       Patient has tried and failed:  Topiramate  Ibuprofen  Flexeril  Amitriptyline   Propranolol  Imitrex   Rizatriptan      Emgality/Galcanzeumab administered subcutaneously at a monthly dose of 120mg significantly reduced migraine frequency, the effects of migraines on daily activities, and the use of acute migraine-specific medication over a period of 6 months. I counseled patient to monitor for signs of injection site reaction and to report these symptoms to your office immediately. I also educated patient about potential for constipation and cramping/muscle spasms and to report any of these symptoms to your office. To date, there have been no negative clinical trials in either episodic migraine or chronic migraine. All 3 injectables have a favorable cardiac profile and can be used in patients with liver disease, kidney disease, stroke, and MI. The side effect profile is also favorable, being limited to site injection pain, muscle spasm, and constipation. The latter is seen with Aimovig, specifically, but not Ajovy or Emgality. These medications are also favorable in that there are no significant drug-drug interactions. While overall the reduction in headache frequency is similar to other preventive medications (anti-convulsants, anti-depressants, anti-hypertensive) they are certainly a great option for patients that have failed other medications or have complicated medical histories +/- use of multiple other medications that may interact with our other drug options. There have been no head-to-head trials comparing the 3 FDA approved injectables (Aimovig, Ajovy, Emgality) to each other. Overview of trials to date:  -Aravind TORRES. History and review of anti-calcitonin gene-related peptide (CGRP) therapies: from translational research to treatment. Headache. 2018; 58 (supplement 3); 238-275. Discussed indication, administration, dose, and side effects with patient of any medications personally prescribed. Patient was advised to let my office know if they have any questions or concerns.        Today, I personally spent 20 minutes in this case, including chart review, time spent with patient doing face to face evaluation w/ interview and exam and patient education, counseling, and time was spent in patient education, counseling, and coordination of care as described above. Issues discussed: Diagnosis and implications on future health, benefits and side effects of present and future medications, test results as well as further testing and medications required. This note was prepared using DP7 Digital voice recognition dictation software and as a result, errors may occur. When identified, these errors have been corrected.  While every attempt is made to correct errors during dictation, discrepancies may still exist    Pedrito Regalado DO   Staff Physician, Neurology   12/19/2023  7:50 AM

## 2023-12-19 NOTE — TELEPHONE ENCOUNTER
PA's have been requested via Epic. Reviewed and electronically signed by:  [address] [facility], [address] [place] [address], [facility]

## 2023-12-19 NOTE — TELEPHONE ENCOUNTER
From: Ardelia Gitelman  To: Kaylene Humphries  Sent: 12/19/2023 10:02 AM CST  Subject: Emgality Not Covered/Prior Auth Required    Hi Dr. Rolando Cha drug stated Emgality is not covered without Prior Auth and that they were sending over the info. They ran the [facility] trial card but it must be covered by ins. And then more is taken off afterwards? Unless you have another type of copay assistance card possibly. They said depending on how fast everything is done it may not be ready until after the holidays . Matilde Juarez ... Thank you again for the visit this morning.    Merari Laureano

## 2023-12-21 NOTE — TELEPHONE ENCOUNTER
PA has been submitted via CoverMyMeds. Will await determination. Reviewed and electronically signed by:  500 [facility], [address] [place] [address], [facility]

## 2023-12-22 NOTE — TELEPHONE ENCOUNTER
PA approval letter received from [employer]  Emgality 120mg/ml auto-inj (up to 1 per 28 days) APPROVED from 12/21/23 to 12/21/24      Letter sent to scanning     oohilove message sent to pt to notify

## 2024-03-15 ENCOUNTER — TELEPHONE (OUTPATIENT)
Dept: FAMILY MEDICINE CLINIC | Facility: CLINIC | Age: 54
End: 2024-03-15

## 2024-03-15 NOTE — TELEPHONE ENCOUNTER
Trying to schedule the res 24 appointments for patient and her daughter back to back in early April but cannot find 2 30minute times next to each other.    Please call patient back to advise? Verbal authorization to leave voicemail.

## 2024-04-20 ENCOUNTER — LAB ENCOUNTER (OUTPATIENT)
Dept: LAB | Age: 54
End: 2024-04-20
Attending: FAMILY MEDICINE
Payer: COMMERCIAL

## 2024-04-20 DIAGNOSIS — E55.9 VITAMIN D DEFICIENCY: ICD-10-CM

## 2024-04-20 DIAGNOSIS — Z00.00 ROUTINE MEDICAL EXAM: ICD-10-CM

## 2024-04-20 LAB
ALBUMIN SERPL-MCNC: 5 G/DL (ref 3.2–4.8)
ALBUMIN/GLOB SERPL: 1.9 {RATIO} (ref 1–2)
ALP LIVER SERPL-CCNC: 99 U/L
ALT SERPL-CCNC: 19 U/L
ANION GAP SERPL CALC-SCNC: 5 MMOL/L (ref 0–18)
AST SERPL-CCNC: 20 U/L (ref ?–34)
BASOPHILS # BLD AUTO: 0.07 X10(3) UL (ref 0–0.2)
BASOPHILS NFR BLD AUTO: 1 %
BILIRUB SERPL-MCNC: 0.6 MG/DL (ref 0.3–1.2)
BUN BLD-MCNC: 9 MG/DL (ref 9–23)
BUN/CREAT SERPL: 11.4 (ref 10–20)
CALCIUM BLD-MCNC: 9.8 MG/DL (ref 8.7–10.4)
CHLORIDE SERPL-SCNC: 105 MMOL/L (ref 98–112)
CHOLEST SERPL-MCNC: 198 MG/DL (ref ?–200)
CO2 SERPL-SCNC: 30 MMOL/L (ref 21–32)
CREAT BLD-MCNC: 0.79 MG/DL
DEPRECATED RDW RBC AUTO: 40.8 FL (ref 35.1–46.3)
EGFRCR SERPLBLD CKD-EPI 2021: 89 ML/MIN/1.73M2 (ref 60–?)
EOSINOPHIL # BLD AUTO: 0.1 X10(3) UL (ref 0–0.7)
EOSINOPHIL NFR BLD AUTO: 1.4 %
ERYTHROCYTE [DISTWIDTH] IN BLOOD BY AUTOMATED COUNT: 12.7 % (ref 11–15)
ESTRADIOL SERPL-MCNC: 12.2 PG/ML
FASTING PATIENT LIPID ANSWER: YES
FASTING STATUS PATIENT QL REPORTED: YES
FSH SERPL-ACNC: 115.7 MIU/ML
GLOBULIN PLAS-MCNC: 2.7 G/DL (ref 2.8–4.4)
GLUCOSE BLD-MCNC: 84 MG/DL (ref 70–99)
HCT VFR BLD AUTO: 42.3 %
HDLC SERPL-MCNC: 76 MG/DL (ref 40–59)
HGB BLD-MCNC: 14.4 G/DL
IMM GRANULOCYTES # BLD AUTO: 0.02 X10(3) UL (ref 0–1)
IMM GRANULOCYTES NFR BLD: 0.3 %
LDLC SERPL CALC-MCNC: 110 MG/DL (ref ?–100)
LYMPHOCYTES # BLD AUTO: 2.32 X10(3) UL (ref 1–4)
LYMPHOCYTES NFR BLD AUTO: 32.2 %
MCH RBC QN AUTO: 29.9 PG (ref 26–34)
MCHC RBC AUTO-ENTMCNC: 34 G/DL (ref 31–37)
MCV RBC AUTO: 87.8 FL
MONOCYTES # BLD AUTO: 0.53 X10(3) UL (ref 0.1–1)
MONOCYTES NFR BLD AUTO: 7.4 %
NEUTROPHILS # BLD AUTO: 4.16 X10 (3) UL (ref 1.5–7.7)
NEUTROPHILS # BLD AUTO: 4.16 X10(3) UL (ref 1.5–7.7)
NEUTROPHILS NFR BLD AUTO: 57.7 %
NONHDLC SERPL-MCNC: 122 MG/DL (ref ?–130)
OSMOLALITY SERPL CALC.SUM OF ELEC: 288 MOSM/KG (ref 275–295)
PLATELET # BLD AUTO: 301 10(3)UL (ref 150–450)
POTASSIUM SERPL-SCNC: 3.8 MMOL/L (ref 3.5–5.1)
PROT SERPL-MCNC: 7.7 G/DL (ref 5.7–8.2)
RBC # BLD AUTO: 4.82 X10(6)UL
SODIUM SERPL-SCNC: 140 MMOL/L (ref 136–145)
TRIGL SERPL-MCNC: 63 MG/DL (ref 30–149)
TSI SER-ACNC: 1.17 MIU/ML (ref 0.55–4.78)
VIT B12 SERPL-MCNC: 501 PG/ML (ref 211–911)
VIT D+METAB SERPL-MCNC: 30.5 NG/ML (ref 30–100)
VLDLC SERPL CALC-MCNC: 11 MG/DL (ref 0–30)
WBC # BLD AUTO: 7.2 X10(3) UL (ref 4–11)

## 2024-04-20 PROCEDURE — 36415 COLL VENOUS BLD VENIPUNCTURE: CPT

## 2024-04-20 PROCEDURE — 80061 LIPID PANEL: CPT

## 2024-04-20 PROCEDURE — 82306 VITAMIN D 25 HYDROXY: CPT

## 2024-04-20 PROCEDURE — 82670 ASSAY OF TOTAL ESTRADIOL: CPT

## 2024-04-20 PROCEDURE — 80053 COMPREHEN METABOLIC PANEL: CPT

## 2024-04-20 PROCEDURE — 83001 ASSAY OF GONADOTROPIN (FSH): CPT

## 2024-04-20 PROCEDURE — 85025 COMPLETE CBC W/AUTO DIFF WBC: CPT

## 2024-04-20 PROCEDURE — 84443 ASSAY THYROID STIM HORMONE: CPT

## 2024-04-20 PROCEDURE — 82607 VITAMIN B-12: CPT

## 2024-04-26 NOTE — PROGRESS NOTES
Will review at your appointment. You are in menopause based on labs. LDL cholesterol is a bit elevated. - Dr. Robertson

## 2024-04-29 ENCOUNTER — OFFICE VISIT (OUTPATIENT)
Dept: FAMILY MEDICINE CLINIC | Facility: CLINIC | Age: 54
End: 2024-04-29
Payer: COMMERCIAL

## 2024-04-29 VITALS
DIASTOLIC BLOOD PRESSURE: 69 MMHG | HEART RATE: 85 BPM | SYSTOLIC BLOOD PRESSURE: 106 MMHG | BODY MASS INDEX: 26.8 KG/M2 | WEIGHT: 157 LBS | HEIGHT: 64 IN

## 2024-04-29 DIAGNOSIS — G43.019 INTRACTABLE MIGRAINE WITHOUT AURA AND WITHOUT STATUS MIGRAINOSUS: ICD-10-CM

## 2024-04-29 DIAGNOSIS — F41.9 ANXIETY: ICD-10-CM

## 2024-04-29 DIAGNOSIS — Z12.31 ENCOUNTER FOR SCREENING MAMMOGRAM FOR MALIGNANT NEOPLASM OF BREAST: ICD-10-CM

## 2024-04-29 DIAGNOSIS — Z78.0 MENOPAUSE: ICD-10-CM

## 2024-04-29 DIAGNOSIS — Z00.00 ROUTINE MEDICAL EXAM: Primary | ICD-10-CM

## 2024-04-29 RX ORDER — FLUOXETINE 10 MG/1
10 CAPSULE ORAL EVERY EVENING
Qty: 90 CAPSULE | Refills: 0 | Status: SHIPPED | OUTPATIENT
Start: 2024-04-29

## 2024-04-29 NOTE — PROGRESS NOTES
HPI:   Aliyah Leon is a 54 year old female who presents for a complete physical exam.    Reports changes from menopause but unclear if also symptoms from stress of her job. Fatigue, headaches, skin changes. Drinking plenty of water. Drinks one cup of coffee daily. More sedentary at work,     Wt Readings from Last 3 Encounters:   24 157 lb (71.2 kg)   23 150 lb (68 kg)   23 150 lb (68 kg)     Body mass index is 26.95 kg/m².       Current Outpatient Medications   Medication Sig Dispense Refill    Cholecalciferol (VITAMIN D) 50 MCG ( UT) Oral Cap Take by mouth.      Eletriptan Hydrobromide 40 MG Oral Tab use at onset; may repeat once after 4 hours- ONLY 2 IN 24 HOUR PERIOD MAX.  This is a 30 day supply. 8 tablet 0    Conj Estrog-Medroxyprogest Ace 0.3-1.5 MG Oral Tab Take 1 tablet by mouth daily. 84 tablet 4    ondansetron (ZOFRAN) 4 mg tablet 1-2 tablets as needed for nausea, no more than 4 tablets in 24 hours 30 tablet 3    Ascorbic Acid (VITAMIN C) 1000 MG Oral Tab Take 1 tablet (1,000 mg total) by mouth daily.      pseudoephedrine 30 MG Oral Tab Take 2 tablets (60 mg total) by mouth daily.      ibuprofen 200 MG Oral Tab Take 3 tablets (600 mg total) by mouth daily.        Past Medical History:    Allergic rhinitis    lifetime allergies    Anxiety    on & off continued since bad marriage & very much from work stress    Carpal tunnel syndrome    right hand    Cervicogenic headache    COVID-19 vaccine administered    Depression    on & off continued since bad marriage & from work    Endometriosis    Inflammatory arthritis    Migraine with aura    PONV (postoperative nausea and vomiting)      Past Surgical History:   Procedure Laterality Date    Colonoscopy N/A 2023    Procedure: COLONOSCOPY;  Surgeon: Sheela Nixon MD;  Location: [facility] MAIN OR    D & c  1998    miscarriage of identical twins    Hysterectomy      ovaries still present    Laparoscopic                   Other surgical history      wisdom teeth      Family History   Problem Relation Age of Onset    Cancer Maternal Grandmother 41         at age 42 of breast cancer    Breast Cancer Maternal Grandmother 41    Cancer Mother         Breast cancer diagnosis age 74/mastectomy      Social History:   Social History     Socioeconomic History    Marital status: Single   Tobacco Use    Smoking status: Never    Smokeless tobacco: Never   Vaping Use    Vaping status: Never Used   Substance and Sexual Activity    Alcohol use: Yes     Alcohol/week: 1.0 standard drink of alcohol     Types: 1 Standard drinks or equivalent per week     Comment: 2 or 3/month on a weekend when home/not working    Drug use: No   Other Topics Concern    Caffeine Concern Yes     Comment: daily coffee    Exercise No   Social History Narrative    The patient does not use an assistive device..      The patient does live in a home with stairs.          REVIEW OF SYSTEMS:   GENERAL: feels well otherwise  SKIN: denies any skin lesions  EYES:denies blurred vision or double vision  HEENT: denies nasal congestion, sinus pain or ST  LUNGS: denies shortness of breath with exertion, denies orthopnea  CARDIOVASCULAR: denies chest pain on exertion  GI: denies abdominal pain,denies heartburn  : denies dysuria, vaginal discharge or itching,irregular menses  MUSCULOSKELETAL: denies back pain  NEURO: denies headaches  PSYCHE:  pos anxiety     EXAM:   /69   Pulse 85   Ht 5' 4\" (1.626 m)   Wt 157 lb (71.2 kg)   BMI 26.95 kg/m²     GENERAL: well developed, well nourished,in no apparent distress  SKIN: no rashes,no suspicious lesions  HEENT: atraumatic, normocephalic,ears and throat are clear  EYES:PERRLA, EOMI,conjunctiva are clear  NECK: supple,no adenopathy,no bruits  LUNGS: clear to auscultation  CARDIO: RRR without murmur  GI: good BS's,no masses, HSM or tenderness  EXTREMITIES: no cyanosis, clubbing or edema  NEURO: Oriented times  three,cranial nerves are intact,motor and sensory are grossly intact    ASSESSMENT AND PLAN:   Aliyah Leon is a 54 year old female who presents for a complete physical exam.    1. Routine medical exam    - [facility] AIME 2D+3D SCREENING BILAT (CPT=77067/04322); Future    2. Encounter for screening mammogram for malignant neoplasm of breast    - [facility] AIME 2D+3D SCREENING BILAT (CPT=77067/70253); Future    3. Anxiety      4. Intractable migraine without aura and without status migrainosus      5. Menopause  Discussed her symptoms are more anxiety/depression. Add prozac in evenings. Plan to wean off prempro if prozac helps       Neha Robertson MD  4/29/2024  8:50 AM

## 2024-06-17 ENCOUNTER — TELEPHONE (OUTPATIENT)
Dept: FAMILY MEDICINE CLINIC | Facility: CLINIC | Age: 54
End: 2024-06-17

## 2024-06-17 RX ORDER — RIZATRIPTAN BENZOATE 10 MG/1
10 TABLET ORAL AS NEEDED
COMMUNITY
Start: 2024-04-15

## 2024-06-17 NOTE — TELEPHONE ENCOUNTER
Fluoxetine is one of the SSRI medications used for post menopausal and I prefer it because Paxil causes weight gain and hard to wean off of it so we will not start it.

## 2024-06-17 NOTE — TELEPHONE ENCOUNTER
[place] pharmacist stated that patient indicated to her that she was prescribed fluoxetine for post menopausal symptoms. Pharmacist indicated that paroxetine is indicated for post menopausal symptoms, not fluoxetine, so wanted to confirm correct medication was prescribed? Also patient is on rizatriptan 10mg( medication list updated) and there is a medication interaction with both the fluoxetine and paroxetine. Please advise if ok to take?

## 2024-06-17 NOTE — TELEPHONE ENCOUNTER
Called [place] pharmacy, spoke with Divya. Informed of PCP message below. Verbalized understanding.     Also patient is on rizatriptan 10mg( medication list updated) and there is a possible medication interaction with both the fluoxetine and Rizatriptan can cause serotonin syndrome. Pharmacy will [place] the patient on symptoms to watch for.

## 2024-08-31 DIAGNOSIS — G43.019 INTRACTABLE MIGRAINE WITHOUT AURA AND WITHOUT STATUS MIGRAINOSUS: ICD-10-CM

## 2024-09-03 RX ORDER — RIZATRIPTAN BENZOATE 10 MG/1
TABLET ORAL
Qty: 12 TABLET | Refills: 0 | OUTPATIENT
Start: 2024-09-03

## 2024-09-03 RX ORDER — ELETRIPTAN HYDROBROMIDE 40 MG/1
TABLET, FILM COATED ORAL
Qty: 8 TABLET | Refills: 0 | Status: SHIPPED | OUTPATIENT
Start: 2024-09-03

## 2024-09-03 NOTE — TELEPHONE ENCOUNTER
Medication Quantity Refills Start End   Eletriptan Hydrobromide 40 MG Oral Tab 8 tablet 0 12/19/2023 --   Sig:   use at onset; may repeat once after 4 hours- ONLY 2 IN 24 HOUR PERIOD MAX.  This is a 30 day supply.     Route:   (none)     Order #:   [id number]           LOV:12/19/2023  NOV: none    Last refill/[place]: 12/19/2023 (#8/ 0 refills)

## 2024-09-03 NOTE — TELEPHONE ENCOUNTER
12/19/2023 Neuro Dr. Martinez discontinued RIZAtriptan in place of ELEtriptan.    Which is patient using?

## 2024-09-04 RX ORDER — RIZATRIPTAN BENZOATE 10 MG/1
TABLET ORAL
Qty: 12 TABLET | Refills: 3 | OUTPATIENT
Start: 2024-09-04

## 2024-09-05 ENCOUNTER — OFFICE VISIT (OUTPATIENT)
Dept: ORTHOPEDICS CLINIC | Facility: CLINIC | Age: 54
End: 2024-09-05
Payer: COMMERCIAL

## 2024-09-05 ENCOUNTER — HOSPITAL ENCOUNTER (OUTPATIENT)
Dept: GENERAL RADIOLOGY | Facility: HOSPITAL | Age: 54
Discharge: HOME | End: 2024-09-05
Attending: ORTHOPAEDIC SURGERY
Payer: COMMERCIAL

## 2024-09-05 VITALS — SYSTOLIC BLOOD PRESSURE: 117 MMHG | DIASTOLIC BLOOD PRESSURE: 70 MMHG

## 2024-09-05 DIAGNOSIS — M75.30 CALCIFIC SUPRASPINATUS TENDONITIS: ICD-10-CM

## 2024-09-05 DIAGNOSIS — M54.12 CERVICAL RADICULOPATHY: Primary | ICD-10-CM

## 2024-09-05 DIAGNOSIS — M25.519 SHOULDER PAIN, UNSPECIFIED CHRONICITY, UNSPECIFIED LATERALITY: ICD-10-CM

## 2024-09-05 PROCEDURE — 73030 X-RAY EXAM OF SHOULDER: CPT | Performed by: ORTHOPAEDIC SURGERY

## 2024-09-05 PROCEDURE — 3074F SYST BP LT 130 MM HG: CPT | Performed by: ORTHOPAEDIC SURGERY

## 2024-09-05 PROCEDURE — 20610 DRAIN/INJ JOINT/BURSA W/O US: CPT | Performed by: ORTHOPAEDIC SURGERY

## 2024-09-05 PROCEDURE — 99214 OFFICE O/P EST MOD 30 MIN: CPT | Performed by: ORTHOPAEDIC SURGERY

## 2024-09-05 PROCEDURE — 3078F DIAST BP <80 MM HG: CPT | Performed by: ORTHOPAEDIC SURGERY

## 2024-09-05 RX ORDER — METHYLPREDNISOLONE 4 MG
TABLET, DOSE PACK ORAL
Qty: 1 EACH | Refills: 0 | Status: SHIPPED | OUTPATIENT
Start: 2024-09-05

## 2024-09-05 RX ORDER — TRIAMCINOLONE ACETONIDE 40 MG/ML
40 INJECTION, SUSPENSION INTRA-ARTICULAR; INTRAMUSCULAR ONCE
Status: COMPLETED | OUTPATIENT
Start: 2024-09-05 | End: 2024-09-05

## 2024-09-05 RX ADMIN — TRIAMCINOLONE ACETONIDE 40 MG: 40 INJECTION, SUSPENSION INTRA-ARTICULAR; INTRAMUSCULAR at 17:00:00

## 2024-09-05 NOTE — PROGRESS NOTES
Per verbal order from Dr. Duong draw up 3ml of 0.5% Marcaine & 2ml 1% lidocaine and 1ml of Kenalog 40 for cortisone injection to left   shoulder.  Melissa PARRA MA    Patient provided education handout for cortisone injection.

## 2024-09-30 RX ORDER — ELETRIPTAN HYDROBROMIDE 40 MG/1
TABLET, FILM COATED ORAL
Qty: 8 TABLET | Refills: 0 | Status: SHIPPED | OUTPATIENT
Start: 2024-09-30

## 2024-09-30 NOTE — TELEPHONE ENCOUNTER
Pt requesting a refill of eletriptan 40mg tablets.    Last office visit: 12/19/23    Next office visit: 12/4/24     Established  Follow Up Visit         Date: December 19, 2023  Patient Name: Aliyah Leon   MRN: [id number]  Primary physician: [address] [place] [address] [address]  [facility] [id number]     Interval History:   --PT was not affordable.  She was using a TENS unit and neck massages.  She has not tried Emgality.  She notes her job causes her stress, leading to migraines.       --Migraines: much worse the past 2 months.  Work is more stressful than ever.  Vomiting, photophobia.  Has to sit in office with lights out.  Wearing sunglasses more at work.  Very sore in her neck, like her \"neck is fusing\" and stiff/painful.  Several per week.  Has chronic daily headache.       --Notes numbness in her fingers when she wakes up, in her right hand > left hand      OUTPATIENT MEDICATIONS  Medications Ordered Prior to Encounter          Current Outpatient Medications on File Prior to Visit   Medication Sig Dispense Refill    Cholecalciferol (VITAMIN D) 50 MCG (2000 UT) Oral Cap Take by mouth.        Conj Estrog-Medroxyprogest Ace 0.3-1.5 MG Oral Tab Take 1 tablet by mouth daily. 84 tablet 4    Rizatriptan Benzoate 10 MG Oral Tab take one tablet by mouth use at onset; may repeat once after 2 hours- ONLY 2 IN 24 HOUR PERIOD MAX.  This is a 30 day supply. 12 tablet 3    ondansetron (ZOFRAN) 4 mg tablet 1-2 tablets as needed for nausea, no more than 4 tablets in 24 hours 30 tablet 3    cyclobenzaprine 5 MG Oral Tab Take 1 tablet (5 mg total) by mouth nightly. (Patient taking differently: Take 1 tablet (5 mg total) by mouth as needed.) 30 tablet 0    Ascorbic Acid (VITAMIN C) 1000 MG Oral Tab Take 1 tablet (1,000 mg total) by mouth daily.        pseudoephedrine 30 MG Oral Tab Take 2 tablets (60 mg total) by mouth daily.        ibuprofen 200 MG Oral Tab Take 3 tablets (600 mg total) by mouth daily.        DULoxetine 20 MG  Oral Cap DR Particles Take 1 capsule (20 mg total) by mouth daily. 30 capsule 1      No current facility-administered medications on file prior to visit.               PHYSICAL EXAM:           /66 (BP Location: Right arm, Patient Position: Sitting)   General appearance: Well appearing, and in no acute distress  Skin: skin color, texture normal.  No rashes or lesions.    Head: Normocephalic, atraumatic.     Neurological exam:     Mental Status:   Attention/Concentration: intact attention on bedside test   Fund of knowledge: intact  Speech: no dysarthria or aphasia         LABS/DATA:  Component      Latest Ref Rn 2/2/2023   WBC      4.0 - 11.0 x10(3) uL 7.3    RBC      3.80 - 5.30 x10(6)uL 4.76    Hemoglobin      12.0 - 16.0 g/dL 14.0    Hematocrit      35.0 - 48.0 % 42.6    MCV      80.0 - 100.0 fL 89.5    MCH      26.0 - 34.0 pg 29.4    MCHC      31.0 - 37.0 g/dL 32.9    RDW-SD      35.1 - 46.3 fL 41.7    RDW      11.0 - 15.0 % 12.6    Platelet Count      150.0 - 450.0 10(3)uL 290.0    Prelim Neutrophil Abs      1.50 - 7.70 x10 (3) uL 5.05    Neutrophils Absolute      1.50 - 7.70 x10(3) uL 5.05    Lymphocytes Absolute      1.00 - 4.00 x10(3) uL 1.67    Monocytes Absolute      0.10 - 1.00 x10(3) uL 0.38    Eosinophils Absolute      0.00 - 0.70 x10(3) uL 0.06    Basophils Absolute      0.00 - 0.20 x10(3) uL 0.07    Immature Granulocyte Absolute      0.00 - 1.00 x10(3) uL 0.02    Neutrophils %      % 69.7    Lymphocytes %      % 23.0    Monocytes %      % 5.2    Eosinophils %      % 0.8    Basophils %      % 1.0    Immature Granulocyte %      % 0.3    Glucose      70 - 99 mg/dL 95    Sodium      136 - 145 mmol/L 140    Potassium      3.5 - 5.1 mmol/L 3.8    Chloride      98 - 112 mmol/L 105    Carbon Dioxide, Total      21.0 - 32.0 mmol/L 29.0    ANION GAP      0 - 18 mmol/L 6    BUN      7 - 18 mg/dL 11    CREATININE      0.55 - 1.02 mg/dL 0.72    BUN/CREATININE RATIO      10.0 - 20.0  15.3    CALCIUM      8.5  - 10.1 mg/dL 9.0    CALCULATED OSMOLALITY      275 - 295 mOsm/kg 289    EGFR      >=60 mL/min/1.73m2 101    ALT (SGPT)      13 - 56 U/L 21    AST (SGOT)      15 - 37 U/L 15    ALKALINE PHOSPHATASE      41 - 108 U/L 91    Total Bilirubin      0.1 - 2.0 mg/dL 0.5    PROTEIN, TOTAL      6.4 - 8.2 g/dL 7.4    Albumin      3.4 - 5.0 g/dL 4.2    Globulin      2.8 - 4.4 g/dL 3.2    A/G Ratio      1.0 - 2.0  1.3    Patient Fasting for CMP? Yes    Cholesterol, Total      <200 mg/dL 172    HDL Cholesterol      40 - 59 mg/dL 75 (H)    Triglycerides      30 - 149 mg/dL 68    LDL Cholesterol Calc      <100 mg/dL 84    VLDL      0 - 30 mg/dL 11    NON-HDL CHOLESTEROL      <130 mg/dL 97    Patient Fasting for Lipid? Yes    TSH      0.358 - 3.740 mIU/mL 0.708    Vitamin B12      193 - 986 pg/mL 409    VITAMIN D, 25-OH, TOTAL      30.0 - 100.0 ng/mL 23.9 (L)       Legend:  (H) High  (L) Low        IMAGING:  N/A     ASSESSMENT:  The patient is a 53 year old woman with past medical history of migraine, endometriosis, carpal tunnel syndrome who presents for follow-up regarding chronic migraine with cervicogenic features which have worsened, work is a trigger for her.  Her neurological examination was normal.     Chronic migraine without aura confirmed no aura today   Cervicogenic headache  -Preventative: Mg and B2, Emgality   Future considerations: Ajovy   -Abortive: Eletriptan + Compazine/Benadryl                 Future considerations: Ubrelvy, Nurtec                 Limit <2 times per week to avoid developing medication overuse headaches   -Neuroimaging: Hold for now in light of normal exam and similar characteristics of migraine  -Follow up: 3 months      Hand numbness r/o carpal tunnel syndrome   -EMG/NCS  -Wrist splints at night           Today we discussed Emgality/Galcanzeumab.      Patient has tried and failed:  Topiramate  Ibuprofen  Flexeril  Amitriptyline   Propranolol  Imitrex   Rizatriptan      Emgality/Galcanzeumab  administered subcutaneously at a monthly dose of 120mg significantly reduced migraine frequency, the effects of migraines on daily activities, and the use of acute migraine-specific medication over a period of 6 months.     I counseled patient to monitor for signs of injection site reaction and to report these symptoms to your office immediately. I also educated patient about potential for constipation and cramping/muscle spasms and to report any of these symptoms to your office.     To date, there have been no negative clinical trials in either episodic migraine or chronic migraine. All 3 injectables have a favorable cardiac profile and can be used in patients with liver disease, kidney disease, stroke, and MI.  The side effect profile is also favorable, being limited to site injection pain, muscle spasm, and constipation.  The latter is seen with Aimovig, specifically, but not Ajovy or Emgality.  These medications are also favorable in that there are no significant drug-drug interactions.       While overall the reduction in headache frequency is similar to other preventive medications (anti-convulsants, anti-depressants, anti-hypertensive) they are certainly a great option for patients that have failed other medications or have complicated medical histories +/- use of multiple other medications that may interact with our other drug options. There have been no head-to-head trials comparing the 3 FDA approved injectables (Aimovig, Ajovy, Emgality) to each other.   Overview of trials to date:  -Aravind TORRES. History and review of anti-calcitonin gene-related peptide (CGRP) therapies: from translational research to treatment. Headache. 2018; 58 (supplement 3); 238-275.      Discussed indication, administration, dose, and side effects with patient of any medications personally prescribed. Patient was advised to let my office know if they have any questions or concerns.         Today, I personally spent 20 minutes in this  case, including chart review, time spent with patient doing face to face evaluation w/ interview and exam and patient education, counseling, and time was spent in patient education, counseling, and coordination of care as described above.   Issues discussed: Diagnosis and implications on future health, benefits and side effects of present and future medications, test results as well as further testing and medications required.     This note was prepared using Dragon Medical voice recognition dictation software and as a result, errors may occur. When identified, these errors have been corrected. While every attempt is made to correct errors during dictation, discrepancies may still exist     ROBERTO Martinez DO   Staff Physician, Neurology   12/19/2023  7:50 AM

## 2024-10-05 ENCOUNTER — PATIENT MESSAGE (OUTPATIENT)
Dept: NEUROLOGY | Facility: CLINIC | Age: 54
End: 2024-10-05

## 2024-10-05 DIAGNOSIS — G44.86 CERVICOGENIC HEADACHE: ICD-10-CM

## 2024-10-05 DIAGNOSIS — G43.709 CHRONIC MIGRAINE WITHOUT AURA WITHOUT STATUS MIGRAINOSUS, NOT INTRACTABLE: Primary | ICD-10-CM

## 2024-10-07 NOTE — TELEPHONE ENCOUNTER
From: Aliyah Leon  To: Yael Martinez  Sent: 10/5/2024 12:59 PM CDT  Subject: MRI Request Please    Dr. Martinez-I've been having more migraines lately & the addition of back of my head feels like electrical jolts & sometimes the visual black floaties. Just had my Ortho appt. w/Dr Duong &due to my neck and shoulder issues he ordered MRI Cervical spine Friday at [place] 5:30pm. Can we please please add an MRI Brain/Neck? I am VERY WORRIED something very wrong & feel that would really show all going on all in one visit. some answers instead of waiting just in case. My dear friend had a recent experience with what was diagnosed w/vertigo for years (meds, therapy etc) got worse finally an MRI ordered & wound up being a tumor. I don't want to have that for myself. TY & Sincerely, Aliyah

## 2024-10-08 NOTE — TELEPHONE ENCOUNTER
Called spoke with patient to inform her that  has added the MRI and MRA orders in the system she may contact them to schedule her appointment ,we provided the number to patient as well.

## 2024-10-10 ENCOUNTER — TELEPHONE (OUTPATIENT)
Dept: ORTHOPEDICS CLINIC | Facility: CLINIC | Age: 54
End: 2024-10-10

## 2024-10-10 ENCOUNTER — HOSPITAL ENCOUNTER (OUTPATIENT)
Dept: GENERAL RADIOLOGY | Facility: HOSPITAL | Age: 54
Discharge: HOME | End: 2024-10-10
Attending: ORTHOPAEDIC SURGERY
Payer: COMMERCIAL

## 2024-10-10 ENCOUNTER — OFFICE VISIT (OUTPATIENT)
Dept: ORTHOPEDICS CLINIC | Facility: CLINIC | Age: 54
End: 2024-10-10

## 2024-10-10 DIAGNOSIS — M25.559 HIP PAIN, UNSPECIFIED LATERALITY: ICD-10-CM

## 2024-10-10 DIAGNOSIS — M25.559 HIP PAIN, UNSPECIFIED LATERALITY: Primary | ICD-10-CM

## 2024-10-10 PROCEDURE — 73502 X-RAY EXAM HIP UNI 2-3 VIEWS: CPT | Performed by: ORTHOPAEDIC SURGERY

## 2024-10-10 PROCEDURE — 99244 OFF/OP CNSLTJ NEW/EST MOD 40: CPT | Performed by: ORTHOPAEDIC SURGERY

## 2024-10-10 RX ORDER — MELOXICAM 7.5 MG/1
7.5 TABLET ORAL DAILY
Qty: 30 TABLET | Refills: 1 | Status: SHIPPED | OUTPATIENT
Start: 2024-10-10

## 2024-10-10 NOTE — TELEPHONE ENCOUNTER
Spoke with Jennifer at [facility] and let her know per provider ok to dispense, advised to provide education on signs/symptoms to look for with regards to bleeding and to reach out to us if they occur. She indicated that they would provide this education.

## 2024-10-10 NOTE — TELEPHONE ENCOUNTER
Pharmacy states there is a risk of GI bleeding with fluoxetine medication. Please advise if its ok to dispense.      Meloxicam 7.5 MG Oral Tab, Take 1 tablet (7.5 mg total) by mouth daily., Disp: 30 tablet, Rfl: 1

## 2024-10-10 NOTE — PROGRESS NOTES
NURSING INTAKE COMMENTS:   Chief Complaint   Patient presents with    Hip Pain     L hip- onset- several mos ago- denies injury- rates pain 5-9/10 on and off- stated soreness w/ prolong sitting       HPI: This 54 year old female presents today with complaints of left hip pain.  She has noted worsening pain in the posterolateral aspect of her left hip over the past few months.  No history of injury.  She recently quit her job and finishes this week.  She does a lot of standing at work.  She notices the pain mainly with prolonged sitting.  She feels pain in her buttocks.  She has no numbness or tingling extending down the leg.  No significant groin pain.    Past Medical History:    Allergic rhinitis    lifetime allergies    Anxiety    on & off continued since bad marriage & very much from work stress    Carpal tunnel syndrome    right hand    Cervicogenic headache    COVID-19 vaccine administered    Depression    on & off continued since bad marriage & from work    Endometriosis    Inflammatory arthritis    Migraine with aura    PONV (postoperative nausea and vomiting)     Past Surgical History:   Procedure Laterality Date    Colonoscopy N/A 2023    Procedure: COLONOSCOPY;  Surgeon: Sheela Nixon MD;  Location: [facility] MAIN OR    D & c  1998    miscarriage of identical twins    Hysterectomy      ovaries still present    Laparoscopic                  Other surgical history      wisdom teeth     Current Outpatient Medications   Medication Sig Dispense Refill    Meloxicam 7.5 MG Oral Tab Take 1 tablet (7.5 mg total) by mouth daily. 30 tablet 1    ELETRIPTAN HYDROBROMIDE 40 MG Oral Tab TAKE ONE TABLET BY MOUTH AT ONSET. MAY REPEAT ONCE AFTER FOUR HOURS. DO NOT EXCEED TWO TABLETS WITHIN 24 HOURS. TAKE FOR 30 DAYS 8 tablet 0    methylPREDNISolone 4 MG Oral Tablet Therapy Pack Take as per instruction sheet. Do not take anti-inflammatory meds such as ibuprofen or naproxen while on this  med. 1 each 0    FLUoxetine 10 MG Oral Cap Take 1 capsule (10 mg total) by mouth every evening. 90 capsule 0    Cholecalciferol (VITAMIN D) 50 MCG (2000 UT) Oral Cap Take by mouth.      Conj Estrog-Medroxyprogest Ace 0.3-1.5 MG Oral Tab Take 1 tablet by mouth daily. 84 tablet 4    ondansetron (ZOFRAN) 4 mg tablet 1-2 tablets as needed for nausea, no more than 4 tablets in 24 hours 30 tablet 3    Ascorbic Acid (VITAMIN C) 1000 MG Oral Tab Take 1 tablet (1,000 mg total) by mouth daily.      pseudoephedrine 30 MG Oral Tab Take 2 tablets (60 mg total) by mouth daily.      ibuprofen 200 MG Oral Tab Take 3 tablets (600 mg total) by mouth daily.       Allergies[1]  Family History   Problem Relation Age of Onset    Cancer Maternal Grandmother 41         at age 42 of breast cancer    Breast Cancer Maternal Grandmother 41    Cancer Mother         Breast cancer diagnosis age 74/mastectomy       Social History     Occupational History    Not on file   Tobacco Use    Smoking status: Never    Smokeless tobacco: Never   Vaping Use    Vaping status: Never Used   Substance and Sexual Activity    Alcohol use: Yes     Alcohol/week: 1.0 standard drink of alcohol     Types: 1 Standard drinks or equivalent per week     Comment: 2 or 3/month on a weekend when home/not working    Drug use: No    Sexual activity: Not on file        Review of Systems:  GENERAL: denies fevers, chills, night sweats, fatigue, unintentional weight loss/gain  SKIN: denies skin lesions, open sores, rash  HEENT:denies recent vision change, new nasal congestion,hearing loss, tinnitus, sore throat, headaches  RESPIRATORY: denies new shortness of breath, cough, asthma, wheezing  CARDIOVASCULAR: denies chest pain, leg cramps with exertion, palpitations, leg swelling  GI: denies abdominal pain, nausea, vomiting, diarrhea, constipation, hematochezia, worsening heartburn or stomach ulcers  : denies dysuria, hematuria, incontinence, increased frequency, urgency,  difficulty urinating  MUSCULOSKELETAL: denies musculoskeletal complaints other than in HPI  NEURO: denies numbness, tingling, weakness, balance issues, dizziness, memory loss  PSYCHIATRIC: denies Hx of depression, anxiety, other psychiatric disorders  HEMATOLOGIC: denies blood clots, anemia, blood clotting disorders, blood transfusion  ENDOCRINE: denies autoimmune disease, thyroid issues, or diabetes  ALLERGY: denies asthma, seasonal allergies    Physical Examination:    There were no vitals taken for this visit.  Constitutional: appears well hydrated, alert and responsive, no acute distress noted  Extremities: She walks without a limp.  Further exam left hip reveals tenderness over the posterior hip and gluteal area.  The sciatic notch is mildly tender to palpation.  Greater trochanter nontender.  She able to actively straight leg raise with minimal discomfort.  Passive straight leg raising produces some posterior pain at 60 to 70 degrees.  Minimal pain with passive flexion internal rotation.  Hip flexion measures 100 degrees.  Internal rotation 40 degrees.  External rotation 70 degrees.  There is pain with ARTHUR test.  Neurological: Light touch and pinprick sensation intact throughout the lower extremities.  Ankle dorsiflexion plantarflexion EHL knee extension and hip flexion strength are 5 out of 5 bilaterally.  No clonus.    Imaging:   X-rays left hip show no significant degenerative changes and no soft tissue calcification or fracture.    Labs:  Lab Results   Component Value Date    WBC 7.2 04/20/2024    HGB 14.4 04/20/2024    .0 04/20/2024      Lab Results   Component Value Date    GLU 84 04/20/2024    BUN 9 04/20/2024    CREATSERUM 0.79 04/20/2024    GFR 94 07/30/2016    GFRNAA 97 05/09/2022    GFRAA 111 05/09/2022        Assessment and Plan:  Diagnoses and all orders for this visit:    Hip pain, unspecified laterality  -     XR HIP W OR WO PELVIS 2 OR 3 VIEWS, LEFT (CPT=73502); Future  -     PHYSICAL  THERAPY - INTERNAL    Other orders  -     Meloxicam 7.5 MG Oral Tab; Take 1 tablet (7.5 mg total) by mouth daily.        Assessment: Left posterior hip pain, findings consistent with lumbar radiculopathy or piriformis syndrome    Plan: I recommend nonoperative treatment.  Suggested outpatient physical therapy, oral anti-inflammatories, activity modifications.  Follow-up again in 6 weeks if symptoms persist.    Follow Up: Return in about 6 weeks (around 11/21/2024).    NAMAN ODEN MD       [1]   Allergies  Allergen Reactions    Adhesive Tape HIVES and SWELLING     Some paper tape ok , if not use coban     Codeine     Morphine     Vicodin [Hydrocodone-Acetaminophen]

## 2024-10-11 ENCOUNTER — HOSPITAL ENCOUNTER (OUTPATIENT)
Dept: MRI IMAGING | Facility: HOSPITAL | Age: 54
Discharge: HOME | End: 2024-10-11
Attending: ORTHOPAEDIC SURGERY
Payer: COMMERCIAL

## 2024-10-11 DIAGNOSIS — M54.12 CERVICAL RADICULOPATHY: ICD-10-CM

## 2024-10-11 PROCEDURE — 72141 MRI NECK SPINE W/O DYE: CPT | Performed by: ORTHOPAEDIC SURGERY

## 2024-10-14 ENCOUNTER — PATIENT MESSAGE (OUTPATIENT)
Dept: FAMILY MEDICINE CLINIC | Facility: CLINIC | Age: 54
End: 2024-10-14

## 2024-10-16 ENCOUNTER — PATIENT MESSAGE (OUTPATIENT)
Dept: FAMILY MEDICINE CLINIC | Facility: CLINIC | Age: 54
End: 2024-10-16

## 2024-10-17 ENCOUNTER — OFFICE VISIT (OUTPATIENT)
Dept: ORTHOPEDICS CLINIC | Facility: CLINIC | Age: 54
End: 2024-10-17
Payer: COMMERCIAL

## 2024-10-17 DIAGNOSIS — M48.02 CERVICAL SPINAL STENOSIS: Primary | ICD-10-CM

## 2024-10-17 DIAGNOSIS — M54.16 LUMBAR RADICULOPATHY: ICD-10-CM

## 2024-10-17 PROCEDURE — 99213 OFFICE O/P EST LOW 20 MIN: CPT | Performed by: ORTHOPAEDIC SURGERY

## 2024-10-17 RX ORDER — METHYLPREDNISOLONE 4 MG/1
TABLET ORAL
Qty: 21 EACH | Refills: 0 | Status: SHIPPED | OUTPATIENT
Start: 2024-10-17

## 2024-10-17 NOTE — PROGRESS NOTES
NURSING INTAKE COMMENTS:   Chief Complaint   Patient presents with    Follow - Up     F/u Left posterior hip pain 5/10. Here for MRI results.    Shoulder Pain     Left shoulder pain /10. Has also severe migraine.       HPI: This 54 year old female presents today with complaints of left posterior hip pain, neck pain, left shoulder pain, headaches.  Her symptoms are essentially unchanged.  She was just here last week.    Past Medical History:    Allergic rhinitis    lifetime allergies    Anxiety    on & off continued since bad marriage & very much from work stress    Carpal tunnel syndrome    right hand    Cervicogenic headache    COVID-19 vaccine administered    Depression    on & off continued since bad marriage & from work    Endometriosis    Inflammatory arthritis    Migraine with aura    PONV (postoperative nausea and vomiting)     Past Surgical History:   Procedure Laterality Date    Colonoscopy N/A 2023    Procedure: COLONOSCOPY;  Surgeon: Sheela Nixon MD;  Location: [facility] MAIN OR    D & c  1998    miscarriage of identical twins    Hysterectomy      ovaries still present    Laparoscopic                  Other surgical history      wisdom teeth     Current Outpatient Medications   Medication Sig Dispense Refill    methylPREDNISolone 4 MG Oral Tablet Therapy Pack Take as per instruction sheet. Do not take anti-inflammatory meds such as ibuprofen or naproxen while on this med. 21 each 0    Meloxicam 7.5 MG Oral Tab Take 1 tablet (7.5 mg total) by mouth daily. 30 tablet 1    ELETRIPTAN HYDROBROMIDE 40 MG Oral Tab TAKE ONE TABLET BY MOUTH AT ONSET. MAY REPEAT ONCE AFTER FOUR HOURS. DO NOT EXCEED TWO TABLETS WITHIN 24 HOURS. TAKE FOR 30 DAYS 8 tablet 0    FLUoxetine 10 MG Oral Cap Take 1 capsule (10 mg total) by mouth every evening. 90 capsule 0    Cholecalciferol (VITAMIN D) 50 MCG (2000) Oral Cap Take by mouth.      Conj Estrog-Medroxyprogest Ace 0.3-1.5 MG Oral Tab  Take 1 tablet by mouth daily. 84 tablet 4    ondansetron (ZOFRAN) 4 mg tablet 1-2 tablets as needed for nausea, no more than 4 tablets in 24 hours 30 tablet 3    Ascorbic Acid (VITAMIN C) 1000 MG Oral Tab Take 1 tablet (1,000 mg total) by mouth daily.      pseudoephedrine 30 MG Oral Tab Take 2 tablets (60 mg total) by mouth daily.      ibuprofen 200 MG Oral Tab Take 3 tablets (600 mg total) by mouth daily.       Allergies[1]  Family History   Problem Relation Age of Onset    Cancer Maternal Grandmother 41         at age 42 of breast cancer    Breast Cancer Maternal Grandmother 41    Cancer Mother         Breast cancer diagnosis age 74/mastectomy       Social History     Occupational History    Not on file   Tobacco Use    Smoking status: Never    Smokeless tobacco: Never   Vaping Use    Vaping status: Never Used   Substance and Sexual Activity    Alcohol use: Yes     Alcohol/week: 1.0 standard drink of alcohol     Types: 1 Standard drinks or equivalent per week     Comment: 2 or 3/month on a weekend when home/not working    Drug use: No    Sexual activity: Not on file        Review of Systems:  GENERAL: denies fevers, chills, night sweats, fatigue, unintentional weight loss/gain  SKIN: denies skin lesions, open sores, rash  HEENT:denies recent vision change, new nasal congestion,hearing loss, tinnitus, sore throat, headaches  RESPIRATORY: denies new shortness of breath, cough, asthma, wheezing  CARDIOVASCULAR: denies chest pain, leg cramps with exertion, palpitations, leg swelling  GI: denies abdominal pain, nausea, vomiting, diarrhea, constipation, hematochezia, worsening heartburn or stomach ulcers  : denies dysuria, hematuria, incontinence, increased frequency, urgency, difficulty urinating  MUSCULOSKELETAL: denies musculoskeletal complaints other than in HPI  NEURO: denies numbness, tingling, weakness, balance issues, dizziness, memory loss  PSYCHIATRIC: denies Hx of depression, anxiety, other psychiatric  disorders  HEMATOLOGIC: denies blood clots, anemia, blood clotting disorders, blood transfusion  ENDOCRINE: denies autoimmune disease, thyroid issues, or diabetes  ALLERGY: denies asthma, seasonal allergies    Physical Examination:    There were no vitals taken for this visit.  Constitutional: appears well hydrated, alert and responsive, no acute distress noted  Extremities: Left upper and lower extremity examinations unchanged  Neurological: Unchanged    Imaging:   MRI SPINE CERVICAL (CPT=72141)    Result Date: 10/14/2024  PROCEDURE: MRI SPINE CERVICAL (CPT=72141)  COMPARISON: None.  INDICATIONS: M54.12 Cervical radiculopathy  TECHNIQUE: A variety of imaging planes and parameters were utilized for visualization of suspected pathology.   FINDINGS:  ALIGNMENT: The anatomic cervical lordosis is preserved. BONES: No fractures or osseous lesions are apparent. CORD: Normal caliber, contour, and signal intensity.  CRANIOCERVICAL AREA: Normal foramen magnum without Chiari malformation.  PARASPINAL AREA: No visible mass.  OTHER: There is no visible swelling of the prevertebral soft tissues.  CERVICAL DISC LEVELS: C2-C3: Small central disc protrusion.  No spinal canal or neural foraminal compromise. C3-C4: Posterior disc-osteophyte complex with superimposed central/right paracentral disc protrusion/extrusion in addition to mild right greater than left uncovertebral joint hypertrophy/facet arthropathy.  Moderate spinal canal and mild right neural foraminal stenosis. C4-C5: Posterior disc-osteophyte complex with bilateral uncovertebral joint hypertrophy/facet arthropathy.  Moderate to severe left and moderate right neural foraminal with mild-to-moderate spinal canal stenosis. C5-C6: Posterior disc-osteophyte complex with bilateral uncovertebral joint hypertrophy/facet arthropathy.  Severe bilateral neural foraminal and moderate spinal canal stenosis. C6-C7: Posterior disc-osteophyte complex with right greater than left  uncovertebral joint hypertrophy/facet arthropathy.  Mild-to-moderate right greater than left neural foraminal and mild spinal canal stenosis. C7-T1: No significant disc/facet abnormality, spinal stenosis, or foraminal stenosis.          CONCLUSION:   1. Multilevel degenerative changes of the cervical spine as detailed. Notable levels as follows:  2. C5-C6:  Severe bilateral neural foraminal and moderate spinal canal stenosis. 3. C4-C5:  Moderate to severe left and moderate right neural foraminal with mild-to-moderate spinal canal stenosis. 4. C3-C4:  Moderate spinal canal and mild right neural foraminal stenosis. 5. C6-C7:  Mild-to-moderate right greater left neural foraminal and mild spinal canal stenosis.  [facility]  Dictated by (CST): Hola Mcknight MD on 10/14/2024 at 8:28 AM     Finalized by (CST): Hola Mcknight MD on 10/14/2024 at 8:33 AM          XR HIP W OR WO PELVIS 2 OR 3 VIEWS, LEFT (CPT=73502)    Result Date: 10/10/2024  PROCEDURE: XR HIP W OR WO PELVIS 2 OR 3 VIEWS, LEFT (CPT=73502)  COMPARISON: [facility], XR HIP W OR WO PELVIS 2 OR 3 VIEWS, LEFT (CPT=73502), 7/03/2018, 10:16 AM.  INDICATIONS: Left hip pain for couple months. No injury.  TECHNIQUE: 3 views were obtained.   FINDINGS:  BONES: No acute fracture or dislocation.  No significant degenerative change left hip. SOFT TISSUES: Negative. No visible soft tissue swelling. EFFUSION: None visible. OTHER: Negative.         CONCLUSION:   No acute osseous abnormality or significant osteoarthritis left hip.    Dictated by (CST): Rod Mackenzie MD on 10/10/2024 at 1:26 PM     Finalized by (CST): Rod Mackenzie MD on 10/10/2024 at 1:27 PM             Labs:  Lab Results   Component Value Date    WBC 7.2 04/20/2024    HGB 14.4 04/20/2024    .0 04/20/2024      Lab Results   Component Value Date    GLU 84 04/20/2024    BUN 9 04/20/2024    CREATSERUM 0.79 04/20/2024    GFR 94 07/30/2016    GFRNAA 97 05/09/2022    GFRAA 111  05/09/2022        Assessment and Plan:  Diagnoses and all orders for this visit:    Cervical spinal stenosis  -     Physiatry Referral - [facility])    Lumbar radiculopathy    Other orders  -     methylPREDNISolone 4 MG Oral Tablet Therapy Pack; Take as per instruction sheet. Do not take anti-inflammatory meds such as ibuprofen or naproxen while on this med.        Assessment: Cervical radiculopathy, cervical stenosis, lumbar radiculopathy    Plan: I feel the source of her headaches is likely her cervical spine.  Advised to follow-up with physiatry for further treatment of her neck and back problems.  I recommend no surgical treatments at this time.  Advised continued use of oral anti-inflammatories.  I have provided a prescription for Medrol Dosepak.  Follow-up with me again as needed.    Follow Up: Return if symptoms worsen or fail to improve.    NAMAN ODEN MD       [1]   Allergies  Allergen Reactions    Adhesive Tape HIVES and SWELLING     Some paper tape ok , if not use coban     Codeine     Morphine     Vicodin [Hydrocodone-Acetaminophen]

## 2024-11-18 NOTE — TELEPHONE ENCOUNTER
Pt requesting a refill of eletriptan 40mg tablets.    Last office visit: 12/19/23    Next office visit: 12/4/24     SSESSMENT:  The patient is a 53 year old woman with past medical history of migraine, endometriosis, carpal tunnel syndrome who presents for follow-up regarding chronic migraine with cervicogenic features which have worsened, work is a trigger for her.  Her neurological examination was normal.     Chronic migraine without aura confirmed no aura today   Cervicogenic headache  -Preventative: Mg and B2, Emgality   Future considerations: Ajovy   -Abortive: Eletriptan + Compazine/Benadryl                 Future considerations: Ubrelvy, Nurtec                 Limit <2 times per week to avoid developing medication overuse headaches   -Neuroimaging: Hold for now in light of normal exam and similar characteristics of migraine  -Follow up: 3 months      Hand numbness r/o carpal tunnel syndrome   -EMG/NCS  -Wrist splints at night           Today we discussed Emgality/Galcanzeumab.      Patient has tried and failed:  Topiramate  Ibuprofen  Flexeril  Amitriptyline   Propranolol  Imitrex   Rizatriptan      Emgality/Galcanzeumab administered subcutaneously at a monthly dose of 120mg significantly reduced migraine frequency, the effects of migraines on daily activities, and the use of acute migraine-specific medication over a period of 6 months.     I counseled patient to monitor for signs of injection site reaction and to report these symptoms to your office immediately. I also educated patient about potential for constipation and cramping/muscle spasms and to report any of these symptoms to your office.     To date, there have been no negative clinical trials in either episodic migraine or chronic migraine. All 3 injectables have a favorable cardiac profile and can be used in patients with liver disease, kidney disease, stroke, and MI.  The side effect profile is also favorable, being limited to site injection pain,  muscle spasm, and constipation.  The latter is seen with Aimovig, specifically, but not Ajovy or Emgality.  These medications are also favorable in that there are no significant drug-drug interactions.       While overall the reduction in headache frequency is similar to other preventive medications (anti-convulsants, anti-depressants, anti-hypertensive) they are certainly a great option for patients that have failed other medications or have complicated medical histories +/- use of multiple other medications that may interact with our other drug options. There have been no head-to-head trials comparing the 3 FDA approved injectables (Aimovig, Ajovy, Emgality) to each other.   Overview of trials to date:  -Aravind TORRES. History and review of anti-calcitonin gene-related peptide (CGRP) therapies: from translational research to treatment. Headache. 2018; 58 (supplement 3); 238-275.      Discussed indication, administration, dose, and side effects with patient of any medications personally prescribed. Patient was advised to let my office know if they have any questions or concerns.         Today, I personally spent 20 minutes in this case, including chart review, time spent with patient doing face to face evaluation w/ interview and exam and patient education, counseling, and time was spent in patient education, counseling, and coordination of care as described above.   Issues discussed: Diagnosis and implications on future health, benefits and side effects of present and future medications, test results as well as further testing and medications required.     This note was prepared using Dragon Medical voice recognition dictation software and as a result, errors may occur. When identified, these errors have been corrected. While every attempt is made to correct errors during dictation, discrepancies may still exist     ROBERTO Martinez DO   Staff Physician, Neurology   12/19/2023  7:50 AM

## 2024-11-19 RX ORDER — ELETRIPTAN HYDROBROMIDE 40 MG/1
TABLET, FILM COATED ORAL
Qty: 8 TABLET | Refills: 0 | Status: SHIPPED | OUTPATIENT
Start: 2024-11-19

## 2024-11-22 ENCOUNTER — HOSPITAL ENCOUNTER (OUTPATIENT)
Dept: MRI IMAGING | Age: 54
Discharge: HOME | End: 2024-11-22
Attending: Other
Payer: COMMERCIAL

## 2024-11-22 DIAGNOSIS — G44.86 CERVICOGENIC HEADACHE: ICD-10-CM

## 2024-11-22 DIAGNOSIS — G43.709 CHRONIC MIGRAINE WITHOUT AURA WITHOUT STATUS MIGRAINOSUS, NOT INTRACTABLE: ICD-10-CM

## 2024-11-22 PROCEDURE — 70549 MR ANGIOGRAPH NECK W/O&W/DYE: CPT | Performed by: OTHER

## 2024-11-22 PROCEDURE — 70546 MR ANGIOGRAPH HEAD W/O&W/DYE: CPT | Performed by: OTHER

## 2024-11-22 PROCEDURE — 70553 MRI BRAIN STEM W/O & W/DYE: CPT | Performed by: OTHER

## 2024-11-22 PROCEDURE — A9575 INJ GADOTERATE MEGLUMI 0.1ML: HCPCS | Performed by: OTHER

## 2024-11-22 RX ORDER — GADOTERATE MEGLUMINE 376.9 MG/ML
15 INJECTION INTRAVENOUS
Status: COMPLETED | OUTPATIENT
Start: 2024-11-22 | End: 2024-11-22

## 2024-11-22 RX ADMIN — GADOTERATE MEGLUMINE 15 ML: 376.9 INJECTION INTRAVENOUS at 19:10:00

## 2024-11-27 ENCOUNTER — TELEPHONE (OUTPATIENT)
Dept: NEUROLOGY | Facility: CLINIC | Age: 54
End: 2024-11-27

## 2024-11-27 RX ORDER — GALCANEZUMAB 120 MG/ML
INJECTION, SOLUTION SUBCUTANEOUS
Qty: 1 ML | Refills: 0 | Status: SHIPPED | OUTPATIENT
Start: 2024-11-27

## 2024-11-27 NOTE — TELEPHONE ENCOUNTER
Requested Prescriptions     Pending Prescriptions Disp Refills    EMGALITY 120 MG/ML Subcutaneous Solution Auto-injector [Pharmacy Med Name: Emgality 120 Mg/Ml Inj Lill] 1 mL 0     Sig: INJECT 120MG INTO THE SKIN EVERY 28 DAYS       Last OV: 12/19/2023  Next OV: 12/4/2024    [place]/;  Galcanezumab-[facility]     Dispensed Written Strength Quantity Refills Days Supply Provider Pharmacy   EMGALITY 120 MG/ML INJ LILL 10/30/2024 12/19/2023 120 1 mL  28 Juan, Yael Hyde, DO [id number] [place]...   EMGALITY 120 MG/ML INJ LILL 09/28/2024 12/19/2023 120 1 mL  28 Juan, Yael Hyde, DO [id number] [place]   EMGALITY 120 MG/ML INJ LILL 06/17/2024 12/19/2023 120 1 mL  28 Juan, Yael Hyde, DO [id number] [place]...       Last office visit plan;    ASSESSMENT:  The patient is a 53 year old woman with past medical history of migraine, endometriosis, carpal tunnel syndrome who presents for follow-up regarding chronic migraine with cervicogenic features which have worsened, work is a trigger for her.  Her neurological examination was normal.     Chronic migraine without aura confirmed no aura today   Cervicogenic headache  -Preventative: Mg and B2, Emgality   Future considerations: Ajovy   -Abortive: Eletriptan + Compazine/Benadryl                 Future considerations: Ubrelvy, Nurtec                 Limit <2 times per week to avoid developing medication overuse headaches   -Neuroimaging: Hold for now in light of normal exam and similar characteristics of migraine  -Follow up: 3 months      Hand numbness r/o carpal tunnel syndrome   -EMG/NCS  -Wrist splints at night           Today we discussed Emgality/Galcanzeumab.      Patient has tried and failed:  Topiramate  Ibuprofen  Flexeril  Amitriptyline   Propranolol  Imitrex   Rizatriptan      Emgality/Galcanzeumab administered subcutaneously at a monthly dose of 120mg significantly reduced migraine frequency, the effects of migraines on daily activities, and the use of acute  migraine-specific medication over a period of 6 months.

## 2024-12-04 ENCOUNTER — OFFICE VISIT (OUTPATIENT)
Dept: NEUROLOGY | Facility: CLINIC | Age: 54
End: 2024-12-04
Payer: COMMERCIAL

## 2024-12-04 ENCOUNTER — TELEPHONE (OUTPATIENT)
Dept: PHYSICAL MEDICINE AND REHAB | Facility: CLINIC | Age: 54
End: 2024-12-04

## 2024-12-04 VITALS
SYSTOLIC BLOOD PRESSURE: 96 MMHG | WEIGHT: 155 LBS | BODY MASS INDEX: 26.46 KG/M2 | HEIGHT: 64 IN | DIASTOLIC BLOOD PRESSURE: 62 MMHG | HEART RATE: 85 BPM

## 2024-12-04 DIAGNOSIS — G43.719 INTRACTABLE CHRONIC MIGRAINE WITHOUT AURA AND WITHOUT STATUS MIGRAINOSUS: Primary | ICD-10-CM

## 2024-12-04 DIAGNOSIS — G43.E19 INTRACTABLE CHRONIC MIGRAINE WITH AURA AND WITHOUT STATUS MIGRAINOSUS: ICD-10-CM

## 2024-12-04 DIAGNOSIS — G44.86 CERVICOGENIC HEADACHE: ICD-10-CM

## 2024-12-04 DIAGNOSIS — M47.812 CERVICAL SPONDYLOSIS: ICD-10-CM

## 2024-12-04 PROCEDURE — 99214 OFFICE O/P EST MOD 30 MIN: CPT | Performed by: OTHER

## 2024-12-04 RX ORDER — UBROGEPANT 100 MG/1
TABLET ORAL
Qty: 10 TABLET | Refills: 0 | Status: SHIPPED | OUTPATIENT
Start: 2024-12-04 | End: 2025-12-04

## 2024-12-04 RX ORDER — TIZANIDINE 2 MG/1
TABLET ORAL
Qty: 270 TABLET | Refills: 0 | Status: SHIPPED | OUTPATIENT
Start: 2024-12-04

## 2024-12-04 RX ORDER — METOCLOPRAMIDE 10 MG/1
10 TABLET ORAL EVERY 6 HOURS PRN
Qty: 270 TABLET | Refills: 0 | Status: SHIPPED | OUTPATIENT
Start: 2024-12-04 | End: 2025-03-04

## 2024-12-04 NOTE — PATIENT INSTRUCTIONS
Change Emgality to Atogepant (3 doses 10 mg, 30 mg and 60 mg - we will use lowest dose and can increase as needed)     Botox     Tizanidine at night - up to 2 times daily EXTRA as needed - will make you tired     Ubrelvy as rescue medication (for when you get a migraine)     Reglan for nausea/migraine pain     No estrogen or the lowest physiological dose of estrogen to reduce stroke risk due to migraine with aura     Spine Medicine   
Statement Selected

## 2024-12-04 NOTE — TELEPHONE ENCOUNTER
Initiated authorization for Botox 200U. CPT/HCPCS , 60110 dx:G43.709 with [facility].  [facility] [employer] PA form has been completed and faxed along with clinicals to [phone]    Status: Pending

## 2024-12-04 NOTE — PROGRESS NOTES
[facility] [address], [address]  [facility] [address]  [phone]          Established  Follow Up Visit       Date: December 4, 2024  Patient Name: Aliyah Leon   MRN: [id number]  Primary physician: [address] [place] [address] [address]  [facility] [id number]    Interval History:   -- The patient is starting new job about 5 weeks ago.  Her old job is very stressful and contributed greatly to her migraines.  While her frontal migraines are better, she is still having migraines that stem from her neck daily.  She has had a few migraines with auras described as scotomas which started recently.  The Tana triptan is not as effective and it does cause severe nausea.  She is taking Compazine for this.  Sometimes she feels that these rescue medications are not effective.  She has not noticed Emgality to be very helpful either.      OUTPATIENT MEDICATIONS  Medications Ordered Prior to Encounter[1]      PHYSICAL EXAM:     Ht 64\"   Wt 155 lb (70.3 kg)   BMI 26.61 kg/m²   General appearance: Well appearing, and in no acute distress  Skin: skin color, texture normal.  No rashes or lesions.    Head: Normocephalic, atraumatic.    Neurological exam:    Mental Status:   Attention/Concentration: intact attention on bedside test   Fund of knowledge: intact  Speech: no dysarthria or aphasia     LABS/DATA:  Reviewed CBC, CMP and TSH    IMAGING:  MRI brain  MRA brain/carotids     MRI C spine   1. Multilevel degenerative changes of the cervical spine as detailed. Notable levels as follows:      2. C5-C6:  Severe bilateral neural foraminal and moderate spinal canal stenosis.   3. C4-C5:  Moderate to severe left and moderate right neural foraminal with mild-to-moderate spinal canal stenosis.   4. C3-C4:  Moderate spinal canal and mild right neural foraminal stenosis.   5. C6-C7:  Mild-to-moderate right greater left neural foraminal and mild spinal canal stenosis.   I PERSONALLY REVIEWED THESE IMAGES.      ASSESSMENT:  The patient is a 54 year old  woman with past medical history of migraine, endometriosis, carpal tunnel syndrome who presents for follow-up regarding chronic migraine with cervicogenic features which have worsened, work is a trigger for her.  Her neurological examination was normal.     Chronic migraine without > with aura   Cervicogenic headache  -Preventative: Change Emgality to atogepant, add Botox  Future considerations: Ajovy   -Abortive: Changed to Ubrelvy, trial of tizanidine                Future considerations: Nurtec, Zavzpret                Limit <2 times per week to avoid developing medication overuse headaches   -Discussed no estrogen supplementation due to migraine with aura  -Follow up: 3 months     Cervical spondylosis  - Spine medicine referral      INITIAL JUSTIFICATION:    Botox Authorization/Reauthorization Questionnaire:      Year of initial migraine onset?  2010  Does patient have more than 15 headache days a month?  yes   If yes, how many days monthly?  30/30  Do headaches last 4 hours or more per day?  yes  Have headaches persisted with this frequency for 3 months or more? yes  Are 8 of these 15 headaches migraines? yes     Any ER visits due to migraines?  No    Disability due to headache?  Yes     List two different abortive medications patient has tried, noting dates used, dosing, and response to each (effective, intolerant, contraindicated or failed)  May include NSAIDs, steriods, triptans, narcotic pain relievers, OTC preparations    Ibuprofen  Flexeril  Imitrex   Rizatriptan   Eletriptan   Compazine     List two different prophylactic medications from two different therapeutic drug classes that patient has tried, noting dates used, dosing, and response to each (effective, intolerant, contraindicated or failed)    Topiramate  Amitriptyline  Propranolol  Emgality    Explained prior authorization timeline and process, including that her insurance may require trial and  failure of additional medication and, if approved, potentially her  purchase of the medication to be administered in the office.         Discussed indication, administration, dose, and side effects with patient of any medications personally prescribed. Patient was advised to let my office know if they have any questions or concerns.       Today, I personally spent 20 minutes in this case, including chart review, time spent with patient doing face to face evaluation w/ interview and exam and patient education, counseling, and time was spent in patient education, counseling, and coordination of care as described above.   Issues discussed: Diagnosis and implications on future health, benefits and side effects of present and future medications, test results as well as further testing and medications required.    This note was prepared using Dragon Medical voice recognition dictation software and as a result, errors may occur. When identified, these errors have been corrected. While every attempt is made to correct errors during dictation, discrepancies may still exist    ROBERTO Martinez DO   Staff Physician, Neurology   12/4/2024  8:03 AM               [1]   Current Outpatient Medications on File Prior to Visit   Medication Sig Dispense Refill    EMGALITY 120 MG/ML Subcutaneous Solution Auto-injector INJECT 120MG INTO THE SKIN EVERY 28 DAYS 1 mL 0    ELETRIPTAN HYDROBROMIDE 40 MG Oral Tab TAKE ONE TABLET BY MOUTH AT ONSET. MAY REPEAT ONCE AFTER FOUR HOURS. DO NOT EXCEED TWO TABLETS WITHIN 24 HOURS. TAKE FOR 30 DAYS 8 tablet 0    Meloxicam 7.5 MG Oral Tab Take 1 tablet (7.5 mg total) by mouth daily. 30 tablet 1    Cholecalciferol (VITAMIN D) 50 MCG (2000 UT) Oral Cap Take by mouth.      Conj Estrog-Medroxyprogest Ace 0.3-1.5 MG Oral Tab Take 1 tablet by mouth daily. 84 tablet 4    ondansetron (ZOFRAN) 4 mg tablet 1-2 tablets as needed for nausea, no more than 4 tablets in 24 hours 30 tablet 3    pseudoephedrine 30 MG Oral Tab  Take 2 tablets (60 mg total) by mouth daily.      ibuprofen 200 MG Oral Tab Take 3 tablets (600 mg total) by mouth daily.      methylPREDNISolone 4 MG Oral Tablet Therapy Pack Take as per instruction sheet. Do not take anti-inflammatory meds such as ibuprofen or naproxen while on this med. 21 each 0    FLUoxetine 10 MG Oral Cap Take 1 capsule (10 mg total) by mouth every evening. 90 capsule 0    Ascorbic Acid (VITAMIN C) 1000 MG Oral Tab Take 1 tablet (1,000 mg total) by mouth daily.       No current facility-administered medications on file prior to visit.

## 2024-12-09 NOTE — TELEPHONE ENCOUNTER
I spoke with Brady from [facility] and was informed the patients insurance will term on 12/31/24 and that she will initiate the Botox for 1 time approval until the patient has updated her insurance plan.

## 2024-12-20 NOTE — TELEPHONE ENCOUNTER
do you approve refill request. I was not able to find a  pap on file or mammogram on file. Please advise

## 2024-12-27 RX ORDER — GALCANEZUMAB 120 MG/ML
INJECTION, SOLUTION SUBCUTANEOUS
Qty: 1 ML | Refills: 0 | Status: SHIPPED | OUTPATIENT
Start: 2024-12-27

## 2024-12-27 NOTE — TELEPHONE ENCOUNTER
Requested Prescriptions     Pending Prescriptions Disp Refills    EMGALITY 120 MG/ML Subcutaneous Solution Auto-injector [Pharmacy Med Name: Emgality 120 Mg/Ml Inj Lill] 1 mL 0     Sig: INJECT 120MG INTO THE SKIN EVERY 28 DAYS       Last OV: 12/4/2024  Next OV: Next Appt: With Neurology (Yael Martinez DO)  03/04/2025 at 8:40 AM    [place]  Galcanezumab-gn     Dispensed Written Strength Quantity Refills Days Supply Provider Pharmacy   EMGALITY 120 MG/ML INJ LILL 11/27/2024 11/27/2024 120 1 mL  28 Yael Martinez DO [id number] [place]   EMGALITY 120 MG/ML INJ LILL 10/30/2024 12/19/2023 120 1 mL  28 Juan, Yael Hyde,  [id number] [place]   EMGALITY 120 MG/ML INJ LILL 09/28/2024 12/19/2023 120 1 mL  28 JuanYael campbell DO [id number] [place]       Last office visit plan;    ASSESSMENT:  The patient is a 54 year old  woman with past medical history of migraine, endometriosis, carpal tunnel syndrome who presents for follow-up regarding chronic migraine with cervicogenic features which have worsened, work is a trigger for her.  Her neurological examination was normal.     Chronic migraine without > with aura   Cervicogenic headache  -Preventative: Change Emgality to atogepant, add Botox  Future considerations: Ajovy   -Abortive: Changed to Ubrelvy, trial of tizanidine                Future considerations: Nurtec, Zavzpret                Limit <2 times per week to avoid developing medication overuse headaches   -Discussed no estrogen supplementation due to migraine with aura  -Follow up: 3 months      Cervical spondylosis  - Spine medicine referral        INITIAL JUSTIFICATION:     Botox Authorization/Reauthorization Questionnaire:       Year of initial migraine onset?  2010  Does patient have more than 15 headache days a month?  yes   If yes, how many days monthly?  30/30  Do headaches last 4 hours or more per day?  yes  Have headaches persisted with this frequency for 3 months or more? yes  Are 8  of these 15 headaches migraines? yes      Any ER visits due to migraines?  No    Disability due to headache?  Yes      List two different abortive medications patient has tried, noting dates used, dosing, and response to each (effective, intolerant, contraindicated or failed)  May include NSAIDs, steriods, triptans, narcotic pain relievers, OTC preparations     Ibuprofen  Flexeril  Imitrex   Rizatriptan   Eletriptan   Compazine      List two different prophylactic medications from two different therapeutic drug classes that patient has tried, noting dates used, dosing, and response to each (effective, intolerant, contraindicated or failed)     Topiramate  Amitriptyline  Propranolol  Emgality     Explained prior authorization timeline and process, including that her insurance may require trial and failure of additional medication and, if approved, potentially her  purchase of the medication to be administered in the office.        Discussed indication, administration, dose, and side effects with patient of any medications personally prescribed. Patient was advised to let my office know if they have any questions or concerns.         Today, I personally spent 20 minutes in this case, including chart review, time spent with patient doing face to face evaluation w/ interview and exam and patient education, counseling, and time was spent in patient education, counseling, and coordination of care as described above.   Issues discussed: Diagnosis and implications on future health, benefits and side effects of present and future medications, test results as well as further testing and medications required.     This note was prepared using Dragon Medical voice recognition dictation software and as a result, errors may occur. When identified, these errors have been corrected. While every attempt is made to correct errors during dictation, discrepancies may still exist     ROBERTO Martinez DO   Staff Physician, Neurology    12/4/2024  8:03 AM          Other Notes    All notes  Instructions    Return in about 3 months (around 3/4/2025).   No

## 2025-02-03 ENCOUNTER — PATIENT MESSAGE (OUTPATIENT)
Dept: NEUROLOGY | Facility: CLINIC | Age: 55
End: 2025-02-03

## 2025-02-03 DIAGNOSIS — M47.812 CERVICAL SPONDYLOSIS: Primary | ICD-10-CM

## 2025-02-06 NOTE — TELEPHONE ENCOUNTER
Phone call returned to pt. Pt has questions about beginning Botox. She states that migraines have been so bad the last 3-4 weeks, pt feels it is stemming from her neck. Pt wants to know if she should be trying physical therapy. Base of neck is always in a 'vice', states feels like bands. Pt would like to have more information on the Botox process to feel more at ease and would like for us to please make sure that she is still approved for the treatment.

## 2025-02-07 NOTE — TELEPHONE ENCOUNTER
Ca;;ed patient to provide her with this information regarding injections sites and authorization process.Her PT orders has been sent through our mail system to her home.      31 injections total, involving the forehead, temples, back of the head, neck and shoulders.

## 2025-03-14 DIAGNOSIS — G43.719 INTRACTABLE CHRONIC MIGRAINE WITHOUT AURA AND WITHOUT STATUS MIGRAINOSUS: ICD-10-CM

## 2025-03-14 NOTE — TELEPHONE ENCOUNTER
Requested Prescriptions     Pending Prescriptions Disp Refills    ubrogepant (UBRELVY) 100 MG Oral Tab 10 tablet 0     Sig: Take one tablet at onset of migraine.  May take additional tablet in 2 hours if needed.  Do not exceed two tablets per 24 hour period.       Patient comment: [facility] sent a request. Hoping it came thru to your office. Needing urgent refill today please if possible as I am out.  Thank you so much.     Last OV: 12/4/2024  Next OV:     [place]  Ubrogepant     Dispensed Written Strength Quantity Refills Days Supply Provider Pharmacy   UBRELVY 100 MG TABLET 01/24/2025 12/04/2024  10 each  30 Juan, Yael Hyde, DO [facility]       Last office visit plan;       ASSESSMENT:  The patient is a 54 year old  woman with past medical history of migraine, endometriosis, carpal tunnel syndrome who presents for follow-up regarding chronic migraine with cervicogenic features which have worsened, work is a trigger for her.  Her neurological examination was normal.     Chronic migraine without > with aura   Cervicogenic headache  -Preventative: Change Emgality to atogepant, add Botox  Future considerations: Ajovy   -Abortive: Changed to Ubrelvy, trial of tizanidine                Future considerations: Nurtec, Zavzpret                Limit <2 times per week to avoid developing medication overuse headaches   -Discussed no estrogen supplementation due to migraine with aura  -Follow up: 3 months      Cervical spondylosis  - Spine medicine referral        INITIAL JUSTIFICATION:     Botox Authorization/Reauthorization Questionnaire:       Year of initial migraine onset?  2010  Does patient have more than 15 headache days a month?  yes   If yes, how many days monthly?  30/30  Do headaches last 4 hours or more per day?  yes  Have headaches persisted with this frequency for 3 months or more? yes  Are 8 of these 15 headaches migraines? yes      Any ER visits due to migraines?  No    Disability  due to headache?  Yes      List two different abortive medications patient has tried, noting dates used, dosing, and response to each (effective, intolerant, contraindicated or failed)  May include NSAIDs, steriods, triptans, narcotic pain relievers, OTC preparations     Ibuprofen  Flexeril  Imitrex   Rizatriptan   Eletriptan   Compazine      List two different prophylactic medications from two different therapeutic drug classes that patient has tried, noting dates used, dosing, and response to each (effective, intolerant, contraindicated or failed)     Topiramate  Amitriptyline  Propranolol  Emgality     Explained prior authorization timeline and process, including that her insurance may require trial and failure of additional medication and, if approved, potentially her  purchase of the medication to be administered in the office.            Discussed indication, administration, dose, and side effects with patient of any medications personally prescribed. Patient was advised to let my office know if they have any questions or concerns.         Today, I personally spent 20 minutes in this case, including chart review, time spent with patient doing face to face evaluation w/ interview and exam and patient education, counseling, and time was spent in patient education, counseling, and coordination of care as described above.   Issues discussed: Diagnosis and implications on future health, benefits and side effects of present and future medications, test results as well as further testing and medications required.     This note was prepared using Dragon Medical voice recognition dictation software and as a result, errors may occur. When identified, these errors have been corrected. While every attempt is made to correct errors during dictation, discrepancies may still exist     ROBERTO Martinez DO   Staff Physician, Neurology   12/4/2024  8:03 AM                    Other Notes    All notes  Instructions      Return in about  3 months (around 3/4/2025).

## 2025-03-18 RX ORDER — UBROGEPANT 100 MG/1
TABLET ORAL
Qty: 10 TABLET | Refills: 0 | Status: SHIPPED | OUTPATIENT
Start: 2025-03-18 | End: 2026-03-14

## 2025-04-16 ENCOUNTER — PATIENT MESSAGE (OUTPATIENT)
Dept: NEUROLOGY | Facility: CLINIC | Age: 55
End: 2025-04-16

## 2025-04-18 DIAGNOSIS — G43.719 INTRACTABLE CHRONIC MIGRAINE WITHOUT AURA AND WITHOUT STATUS MIGRAINOSUS: ICD-10-CM

## 2025-04-21 RX ORDER — UBROGEPANT 100 MG/1
1 TABLET ORAL AS NEEDED
Qty: 10 TABLET | Refills: 2 | Status: SHIPPED | OUTPATIENT
Start: 2025-04-21

## 2025-04-21 NOTE — TELEPHONE ENCOUNTER
Medication: ubrogepant (UBRELVY) 100 MG Oral Tab      Date of last refill: 03/18/2025 (#10/0)  [place] 03[place]18/2025     Last office visit: 12/04/2024  Due back to clinic per last office note:  3 months  Date next office visit scheduled:    Future Appointments   Date Time Provider Department Center   6/11/2025  8:40 AM Yael Martinez, DO [facility]   6/24/2025  8:40 AM JuanYael campbell,  [facility]           Last OV note recommendation:    ASSESSMENT:  The patient is a 54 year old  woman with past medical history of migraine, endometriosis, carpal tunnel syndrome who presents for follow-up regarding chronic migraine with cervicogenic features which have worsened, work is a trigger for her.  Her neurological examination was normal.     Chronic migraine without > with aura   Cervicogenic headache  -Preventative: Change Emgality to atogepant, add Botox  Future considerations: Ajovy   -Abortive: Changed to Ubrelvy, trial of tizanidine                Future considerations: Nurtec, Zavzpret                Limit <2 times per week to avoid developing medication overuse headaches   -Discussed no estrogen supplementation due to migraine with aura  -Follow up: 3 months      Cervical spondylosis  - Spine medicine referral        INITIAL JUSTIFICATION:     Botox Authorization/Reauthorization Questionnaire:       Year of initial migraine onset?  2010  Does patient have more than 15 headache days a month?  yes   If yes, how many days monthly?  30/30  Do headaches last 4 hours or more per day?  yes  Have headaches persisted with this frequency for 3 months or more? yes  Are 8 of these 15 headaches migraines? yes      Any ER visits due to migraines?  No    Disability due to headache?  Yes      List two different abortive medications patient has tried, noting dates used, dosing, and response to each (effective, intolerant, contraindicated or failed)  May include NSAIDs,  steriods, triptans, narcotic pain relievers, OTC preparations     Ibuprofen  Flexeril  Imitrex   Rizatriptan   Eletriptan   Compazine      List two different prophylactic medications from two different therapeutic drug classes that patient has tried, noting dates used, dosing, and response to each (effective, intolerant, contraindicated or failed)     Topiramate  Amitriptyline  Propranolol  Emgality     Explained prior authorization timeline and process, including that her insurance may require trial and failure of additional medication and, if approved, potentially her  purchase of the medication to be administered in the office.            Discussed indication, administration, dose, and side effects with patient of any medications personally prescribed. Patient was advised to let my office know if they have any questions or concerns.         Today, I personally spent 20 minutes in this case, including chart review, time spent with patient doing face to face evaluation w/ interview and exam and patient education, counseling, and time was spent in patient education, counseling, and coordination of care as described above.   Issues discussed: Diagnosis and implications on future health, benefits and side effects of present and future medications, test results as well as further testing and medications required.     This note was prepared using Dragon Medical voice recognition dictation software and as a result, errors may occur. When identified, these errors have been corrected. While every attempt is made to correct errors during dictation, discrepancies may still exist     ROBERTO Martinez DO   Staff Physician, Neurology   12/4/2024  8:03 AM

## 2025-06-05 DIAGNOSIS — G43.719 INTRACTABLE CHRONIC MIGRAINE WITHOUT AURA AND WITHOUT STATUS MIGRAINOSUS: ICD-10-CM

## 2025-06-06 RX ORDER — UBROGEPANT 100 MG/1
1 TABLET ORAL AS NEEDED
Qty: 10 TABLET | Refills: 5 | Status: SHIPPED | OUTPATIENT
Start: 2025-06-06

## 2025-06-06 NOTE — TELEPHONE ENCOUNTER
Requested Prescriptions     Pending Prescriptions Disp Refills    UBRELVY 100 MG Oral Tab [Pharmacy Med Name: -UBRELVY 100 MG TABLET] 10 tablet 5     Sig: Take one tablet at onset of migraine. May take additional tablet in 2 hours if needed. Do not exceed two tablets per 24 hour period.     [place]  Ubrogepant     Dispensed Written Strength Quantity Refills Days Supply Provider Pharmacy   UBRELVY 100 MG TABLET 06/05/2025 04/21/2025 100 MG 10 tablet  30 Vicente Morillo MD [facility]...   UBRELVY 100 MG TABLET 05/14/2025 04/21/2025  10 each  30 Vicente Morillo MD [facility]...   UBRELVY 100 MG TABLET 04/22/2025 04/21/2025  10 each  30 Vicente Morillo MD [facility]...     Last OV: 12/04/2024  Next OV:     Last office visit plan;    ASSESSMENT:  The patient is a 54 year old  woman with past medical history of migraine, endometriosis, carpal tunnel syndrome who presents for follow-up regarding chronic migraine with cervicogenic features which have worsened, work is a trigger for her.  Her neurological examination was normal.     Chronic migraine without > with aura   Cervicogenic headache  -Preventative: Change Emgality to atogepant, add Botox  Future considerations: Ajovy   -Abortive: Changed to Ubrelvy, trial of tizanidine                Future considerations: Nurtec, Zavzpret                Limit <2 times per week to avoid developing medication overuse headaches   -Discussed no estrogen supplementation due to migraine with aura  -Follow up: 3 months      Cervical spondylosis  - Spine medicine referral        INITIAL JUSTIFICATION:     Botox Authorization/Reauthorization Questionnaire:       Year of initial migraine onset?  2010  Does patient have more than 15 headache days a month?  yes   If yes, how many days monthly?  30/30  Do headaches last 4 hours or more per day?  yes  Have headaches persisted with this frequency for 3 months or more? yes  Are 8 of these 15  headaches migraines? yes      Any ER visits due to migraines?  No    Disability due to headache?  Yes      List two different abortive medications patient has tried, noting dates used, dosing, and response to each (effective, intolerant, contraindicated or failed)  May include NSAIDs, steriods, triptans, narcotic pain relievers, OTC preparations     Ibuprofen  Flexeril  Imitrex   Rizatriptan   Eletriptan   Compazine      List two different prophylactic medications from two different therapeutic drug classes that patient has tried, noting dates used, dosing, and response to each (effective, intolerant, contraindicated or failed)     Topiramate  Amitriptyline  Propranolol  Emgality     Explained prior authorization timeline and process, including that her insurance may require trial and failure of additional medication and, if approved, potentially her  purchase of the medication to be administered in the office.            Discussed indication, administration, dose, and side effects with patient of any medications personally prescribed. Patient was advised to let my office know if they have any questions or concerns.         Today, I personally spent 20 minutes in this case, including chart review, time spent with patient doing face to face evaluation w/ interview and exam and patient education, counseling, and time was spent in patient education, counseling, and coordination of care as described above.   Issues discussed: Diagnosis and implications on future health, benefits and side effects of present and future medications, test results as well as further testing and medications required.     This note was prepared using Dragon Medical voice recognition dictation software and as a result, errors may occur. When identified, these errors have been corrected. While every attempt is made to correct errors during dictation, discrepancies may still exist     ROBERTO Martinez DO   Staff Physician, Neurology   12/4/2024  8:03  AM             Instructions      Return in about 3 months (around 3/4/2025).

## 2025-08-06 ENCOUNTER — PATIENT MESSAGE (OUTPATIENT)
Dept: FAMILY MEDICINE CLINIC | Facility: CLINIC | Age: 55
End: 2025-08-06

## 2025-08-06 DIAGNOSIS — Z00.00 ROUTINE MEDICAL EXAM: Primary | ICD-10-CM

## 2025-08-06 DIAGNOSIS — Z78.0 POST-MENOPAUSAL: ICD-10-CM

## 2025-08-06 DIAGNOSIS — E55.9 VITAMIN D DEFICIENCY: ICD-10-CM

## 2025-08-30 ENCOUNTER — LAB ENCOUNTER (OUTPATIENT)
Dept: LAB | Age: 55
End: 2025-08-30
Attending: FAMILY MEDICINE

## 2025-08-30 DIAGNOSIS — Z78.0 POST-MENOPAUSAL: ICD-10-CM

## 2025-08-30 LAB
ALBUMIN SERPL-MCNC: 5 G/DL (ref 3.2–4.8)
ALBUMIN/GLOB SERPL: 2 (ref 1–2)
ALP LIVER SERPL-CCNC: 103 U/L (ref 41–108)
ALT SERPL-CCNC: 23 U/L (ref 10–49)
ANION GAP SERPL CALC-SCNC: 9 MMOL/L (ref 0–18)
AST SERPL-CCNC: 23 U/L (ref ?–34)
BASOPHILS # BLD AUTO: 0.07 X10(3) UL (ref 0–0.2)
BASOPHILS NFR BLD AUTO: 1 %
BILIRUB SERPL-MCNC: 0.6 MG/DL (ref 0.3–1.2)
BUN BLD-MCNC: 9 MG/DL (ref 9–23)
BUN/CREAT SERPL: 10.2 (ref 10–20)
CALCIUM BLD-MCNC: 10 MG/DL (ref 8.7–10.4)
CHLORIDE SERPL-SCNC: 106 MMOL/L (ref 98–112)
CHOLEST SERPL-MCNC: 176 MG/DL (ref ?–200)
CO2 SERPL-SCNC: 26 MMOL/L (ref 21–32)
CREAT BLD-MCNC: 0.88 MG/DL (ref 0.55–1.02)
DEPRECATED RDW RBC AUTO: 42.8 FL (ref 35.1–46.3)
EGFRCR SERPLBLD CKD-EPI 2021: 78 ML/MIN/1.73M2 (ref 60–?)
EOSINOPHIL # BLD AUTO: 0.08 X10(3) UL (ref 0–0.7)
EOSINOPHIL NFR BLD AUTO: 1.2 %
ERYTHROCYTE [DISTWIDTH] IN BLOOD BY AUTOMATED COUNT: 13.3 % (ref 11–15)
FASTING PATIENT LIPID ANSWER: YES
FASTING STATUS PATIENT QL REPORTED: YES
GLOBULIN PLAS-MCNC: 2.5 G/DL (ref 2–3.5)
GLUCOSE BLD-MCNC: 92 MG/DL (ref 70–99)
HCT VFR BLD AUTO: 41.1 % (ref 35–48)
HDLC SERPL-MCNC: 64 MG/DL (ref 40–59)
HGB BLD-MCNC: 13.5 G/DL (ref 12–16)
IMM GRANULOCYTES # BLD AUTO: 0.02 X10(3) UL (ref 0–1)
IMM GRANULOCYTES NFR BLD: 0.3 %
LDLC SERPL CALC-MCNC: 95 MG/DL (ref ?–100)
LYMPHOCYTES # BLD AUTO: 2.17 X10(3) UL (ref 1–4)
LYMPHOCYTES NFR BLD AUTO: 31.9 %
MCH RBC QN AUTO: 28.8 PG (ref 26–34)
MCHC RBC AUTO-ENTMCNC: 32.8 G/DL (ref 31–37)
MCV RBC AUTO: 87.6 FL (ref 80–100)
MONOCYTES # BLD AUTO: 0.5 X10(3) UL (ref 0.1–1)
MONOCYTES NFR BLD AUTO: 7.4 %
NEUTROPHILS # BLD AUTO: 3.96 X10 (3) UL (ref 1.5–7.7)
NEUTROPHILS # BLD AUTO: 3.96 X10(3) UL (ref 1.5–7.7)
NEUTROPHILS NFR BLD AUTO: 58.2 %
NONHDLC SERPL-MCNC: 112 MG/DL (ref ?–130)
OSMOLALITY SERPL CALC.SUM OF ELEC: 290 MOSM/KG (ref 275–295)
PLATELET # BLD AUTO: 311 10(3)UL (ref 150–450)
POTASSIUM SERPL-SCNC: 4.1 MMOL/L (ref 3.5–5.1)
PROGEST SERPL-MCNC: <0.21 NG/ML
PROT SERPL-MCNC: 7.5 G/DL (ref 5.7–8.2)
RBC # BLD AUTO: 4.69 X10(6)UL (ref 3.8–5.3)
SODIUM SERPL-SCNC: 141 MMOL/L (ref 136–145)
TRIGL SERPL-MCNC: 95 MG/DL (ref 30–149)
TSI SER-ACNC: 1.51 UIU/ML (ref 0.55–4.78)
VIT B12 SERPL-MCNC: 425 PG/ML (ref 211–911)
VIT D+METAB SERPL-MCNC: 22.1 NG/ML (ref 30–100)
VLDLC SERPL CALC-MCNC: 16 MG/DL (ref 0–30)
WBC # BLD AUTO: 6.8 X10(3) UL (ref 4–11)

## 2025-08-30 PROCEDURE — 36415 COLL VENOUS BLD VENIPUNCTURE: CPT | Performed by: FAMILY MEDICINE

## 2025-08-30 PROCEDURE — 82671 ASSAY OF ESTROGENS: CPT

## 2025-08-30 PROCEDURE — 80053 COMPREHEN METABOLIC PANEL: CPT | Performed by: FAMILY MEDICINE

## 2025-08-30 PROCEDURE — 80061 LIPID PANEL: CPT | Performed by: FAMILY MEDICINE

## 2025-08-30 PROCEDURE — 82306 VITAMIN D 25 HYDROXY: CPT | Performed by: FAMILY MEDICINE

## 2025-08-30 PROCEDURE — 82607 VITAMIN B-12: CPT | Performed by: FAMILY MEDICINE

## 2025-08-30 PROCEDURE — 84144 ASSAY OF PROGESTERONE: CPT

## 2025-08-30 PROCEDURE — 84443 ASSAY THYROID STIM HORMONE: CPT | Performed by: FAMILY MEDICINE

## 2025-08-30 PROCEDURE — 85025 COMPLETE CBC W/AUTO DIFF WBC: CPT | Performed by: FAMILY MEDICINE

## (undated) NOTE — LETTER
AUTHORIZATION FOR SURGICAL OPERATION OR OTHER PROCEDURE    1. I hereby authorize Dr. Urmila Rivera [facility], and [address] [place] [address] staff assigned to my case to perform the following operation and/or procedure at the [address] [place] [address]:    Left shoulder cortisone injection  _______________________________________________________________________________________________      _______________________________________________________________________________________________    2. My physician has explained the nature and purpose of the operation or other procedure, possible alternative methods of treatment, the risks involved, and the possibility of complication to me. I acknowledge that no guarantee has been made as to the result that may be obtained. 3.  I recognize that, during the course of this operation, or other procedure, unforseen conditions may necessitate additional or different procedure than those listed above. I, therefore, further authorize and request that the above named physician, his/her physician assistants or designees perform such procedures as are, in his/her professional opinion, necessary and desirable. 4.  Any tissue or organs removed in the operation or other procedure may be disposed of by and at the discretion of the [address] [place] [address] and [facility]. 5.  I understand that in the event of a medical emergency, I will be transported by local paramedics to [facility] or other hospital emergency department. 6.  I certify that I have read and fully understand the above consent to operation and/or other procedure. 7.  I acknowledge that my physician has explained sedation/analgesia administration to me including the risks and benefits. I consent to the administration of sedation/analgesia as may be necessary or desirable in the judgement of my physician.     Witness signature: ___________________________________________________ Date: ______/______/_____                    Time:  ________ A. M.  P.M. Patient Name:  ______________________________________________________  (please print)      Patient signature:  ___________________________________________________             Relationship to Patient:           []  Parent    Responsible person                          []  Spouse  In case of minor or                    [] Other  _____________   Incompetent name:  __________________________________________________                               (please print)      _____________      Responsible person  In case of minor or  Incompetent signature:  _______________________________________________    Statement of Physician  My signature below affirms that prior to the time of the procedure, I have explained to the patient and/or his/her guardian, the risks and benefits involved in the proposed treatment and any reasonable alternative to the proposed treatment. I have also explained the risks and benefits involved in the refusal of the proposed treatment and have answered the patient's questions.                         Date:  ______/______/_______  Provider                      Signature:  __________________________________________________________       Time:  ___________ A.M    P.M.

## (undated) NOTE — LETTER
Kalie Aguillon, [address]  [address] [facility], [address] [place] [address] Ave       01/09/20        Patient: Kelly Vick   YOB: 1970   Date of Visit: 1/9/2020       Dear  Dr. Wander Melendez MD,      Thank you for referring Tyronolayinka Carolyne

## (undated) NOTE — LETTER
AUTHORIZATION FOR SURGICAL OPERATION OR OTHER PROCEDURE    1. I hereby authorize Dr. Duong, and [facility] staff assigned to my case to perform the following operation and/or procedure at the [facility] site:    Right shoulder cortisone injection   _______________________________________________________________________________________________      _______________________________________________________________________________________________    2.  My physician has explained the nature and purpose of the operation or other procedure, possible alternative methods of treatment, the risks involved, and the possibility of complication to me.  I acknowledge that no guarantee has been made as to the result that may be obtained.  3.  I recognize that, during the course of this operation, or other procedure, unforseen conditions may necessitate additional or different procedure than those listed above.  I, therefore, further authorize and request that the above named physician, his/her physician assistants or designees perform such procedures as are, in his/her professional opinion, necessary and desirable.  4.  Any tissue or organs removed in the operation or other procedure may be disposed of by and at the discretion of the [facility] and [facility].  5.  I understand that in the event of a medical emergency, I will be transported by local paramedics to [facility] or other hospital emergency department.  6.  I certify that I have read and fully understand the above consent to operation and/or other procedure.    7.  I acknowledge that my physician has explained sedation/analgesia administration to me including the risks and benefits.  I consent to the administration of sedation/analgesia as may be necessary or desirable in the judgement of my physician.    Witness signature: ___________________________________________________ Date:   ______/______/_____                    Time:  ________ A.M.  P.M.       Patient Name:  ______________________________________________________  (please print)      Patient signature:  ___________________________________________________             Relationship to Patient:           []  Parent    Responsible person                          []  Spouse  In case of minor or                    [] Other  _____________   Incompetent name:  __________________________________________________                               (please print)      _____________      Responsible person  In case of minor or  Incompetent signature:  _______________________________________________    Statement of Physician  My signature below affirms that prior to the time of the procedure, I have explained to the patient and/or his/her guardian, the risks and benefits involved in the proposed treatment and any reasonable alternative to the proposed treatment.  I have also explained the risks and benefits involved in the refusal of the proposed treatment and have answered the patient's questions.                        Date:  ______/______/_______  Provider                      Signature:  __________________________________________________________       Time:  ___________ A.M    P.M.